# Patient Record
Sex: MALE | Race: WHITE | NOT HISPANIC OR LATINO | Employment: OTHER | URBAN - METROPOLITAN AREA
[De-identification: names, ages, dates, MRNs, and addresses within clinical notes are randomized per-mention and may not be internally consistent; named-entity substitution may affect disease eponyms.]

---

## 2022-06-27 ENCOUNTER — OFFICE VISIT (OUTPATIENT)
Dept: INTERNAL MEDICINE | Facility: CLINIC | Age: 72
End: 2022-06-27

## 2022-06-27 VITALS
HEART RATE: 84 BPM | WEIGHT: 254 LBS | SYSTOLIC BLOOD PRESSURE: 130 MMHG | HEIGHT: 72 IN | BODY MASS INDEX: 34.4 KG/M2 | DIASTOLIC BLOOD PRESSURE: 82 MMHG | OXYGEN SATURATION: 97 %

## 2022-06-27 DIAGNOSIS — H91.93 HEARING DIFFICULTY OF BOTH EARS: ICD-10-CM

## 2022-06-27 DIAGNOSIS — R29.898 LIMB WEAKNESS: ICD-10-CM

## 2022-06-27 DIAGNOSIS — F32.A DEPRESSION, UNSPECIFIED DEPRESSION TYPE: Primary | ICD-10-CM

## 2022-06-27 DIAGNOSIS — Z00.00 HEALTH CARE MAINTENANCE: ICD-10-CM

## 2022-06-27 DIAGNOSIS — Z12.5 SCREENING FOR MALIGNANT NEOPLASM OF PROSTATE: ICD-10-CM

## 2022-06-27 PROCEDURE — 99204 OFFICE O/P NEW MOD 45 MIN: CPT | Performed by: PHYSICIAN ASSISTANT

## 2022-06-27 RX ORDER — FLUOXETINE 10 MG/1
10 CAPSULE ORAL DAILY
Qty: 90 CAPSULE | Refills: 1 | Status: SHIPPED | OUTPATIENT
Start: 2022-06-27 | End: 2022-07-12

## 2022-06-27 NOTE — PROGRESS NOTES
[facility] PRIMARY CARE  [address]  [facility] [facility] [address]  Dept: [phone]  Dept Fax: [phone]  Loc: [phone]  Loc Fax: [phone]    Momo Irvin  1950    New Patient Office Note    History of Present Illness:  Patient is a 71-year-old male in today to establish care for decreased hearing in both ears, limb weakness, and depression.  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  Patient has seen a lot of traumatic events throughout his career.  Patient suffering from depression now and has lost a lot of close friends over the last year.  Patient having difficulty concentrating and tearful.  Patient open to trying a medication for this.  Also open to seeing a therapist to discuss his feelings.  Patient has noticed a change in his temper and has not as much patience as he used to.  Patient denies any SI or HI.    Patient reports limb weakness and would like referred to physical therapy for further evaluation and treatment.    Patient has noticed decreased hearing in both of his ears over the last year.  Patient would like referral to audiology for further evaluation and treatment.      The following portions of the patient's history were reviewed and updated as appropriate: allergies, current medications, past family history, past medical history, past social history, past surgical history, and problem list.    Medications:    Current Outpatient Medications:   •  FLUoxetine (PROzac) 10 MG capsule, Take 1 capsule by mouth Daily., Disp: 90 capsule, Rfl: 1    Subjective  No Known Allergies     Past Medical History:   Diagnosis Date   • HL (hearing loss)        Past Surgical History:   Procedure Laterality Date   • THYROID SURGERY         Family History   Problem Relation Age of Onset   • Hyperlipidemia Mother    • Hypertension Mother    • Cancer Mother         Social History     Socioeconomic History   • Marital status:  "   Tobacco Use   • Smoking status: Never Smoker   • Smokeless tobacco: Never Used   Vaping Use   • Vaping Use: Never used   Substance and Sexual Activity   • Alcohol use: Never   • Drug use: Never   • Sexual activity: Defer       Review of Systems   Constitutional: Negative for activity change, chills, fatigue, fever and unexpected weight change.   HENT: Positive for hearing loss. Negative for congestion, ear pain, postnasal drip, sinus pressure and sore throat.    Eyes: Negative for pain, discharge and redness.   Respiratory: Negative for cough, shortness of breath and wheezing.    Cardiovascular: Negative for chest pain, palpitations and leg swelling.   Gastrointestinal: Negative for diarrhea, nausea and vomiting.   Endocrine: Negative for cold intolerance and heat intolerance.   Genitourinary: Negative for decreased urine volume and dysuria.   Musculoskeletal: Negative for arthralgias and myalgias.   Skin: Negative for rash and wound.   Neurological: Positive for weakness. Negative for dizziness, light-headedness and headaches.   Hematological: Does not bruise/bleed easily.   Psychiatric/Behavioral: Positive for decreased concentration and dysphoric mood. Negative for confusion, self-injury, sleep disturbance and suicidal ideas. The patient is not nervous/anxious.        Objective  Vitals:    06/27/22 1139   BP: 130/82   Pulse: 84   SpO2: 97%   Weight: 115 kg (254 lb)   Height: 182.9 cm (72\")       Physical Exam  Physical Exam  Vitals and nursing note reviewed.   Constitutional:       General: He is not in acute distress.     Appearance: He is not ill-appearing.   HENT:      Head: Normocephalic.      Right Ear: Tympanic membrane, ear canal and external ear normal. There is no impacted cerumen.      Left Ear: Tympanic membrane, ear canal and external ear normal. There is no impacted cerumen.      Nose: No congestion or rhinorrhea.      Mouth/Throat:      Mouth: Mucous membranes are moist.      Pharynx: " Oropharynx is clear. No oropharyngeal exudate or posterior oropharyngeal erythema.   Eyes:      General:         Right eye: No discharge.         Left eye: No discharge.      Extraocular Movements: Extraocular movements intact.      Conjunctiva/sclera: Conjunctivae normal.      Pupils: Pupils are equal, round, and reactive to light.   Cardiovascular:      Rate and Rhythm: Normal rate and regular rhythm.      Heart sounds: Normal heart sounds. No murmur heard.    No friction rub. No gallop.   Pulmonary:      Effort: Pulmonary effort is normal. No respiratory distress.      Breath sounds: Normal breath sounds. No wheezing.   Abdominal:      General: Bowel sounds are normal. There is no distension.      Palpations: Abdomen is soft. There is no mass.      Tenderness: There is no abdominal tenderness.   Musculoskeletal:         General: No swelling. Normal range of motion.      Cervical back: Normal range of motion. No tenderness.      Right lower leg: No edema.      Left lower leg: No edema.   Lymphadenopathy:      Cervical: No cervical adenopathy.   Skin:     Findings: No bruising, erythema or rash.   Neurological:      Mental Status: He is oriented to person, place, and time.      Gait: Gait normal.   Psychiatric:         Mood and Affect: Mood normal.         Behavior: Behavior normal.         Thought Content: Thought content normal.         Judgment: Judgment normal.         Diagnostic Data  Procedures    Assessment  Diagnoses and all orders for this visit:    1. Depression, unspecified depression type (Primary)  -     Ambulatory Referral to Psychiatry    2. Limb weakness  -     Ambulatory Referral to Physical Therapy Evaluate and treat    3. Hearing difficulty of both ears  -     Ambulatory Referral to Audiology    4. Health care maintenance  -     CBC w AUTO Differential; Future  -     Comprehensive Metabolic Panel  -     TSH; Future  -     Hemoglobin A1c; Future  -     Lipid Panel  -     Hepatitis C  Antibody    Other orders  -     FLUoxetine (PROzac) 10 MG capsule; Take 1 capsule by mouth Daily.  Dispense: 90 capsule; Refill: 1        Plan    1. Depression, unspecified depression type (Primary)- worse, start Prozac 10 mg daily.  Advised for any side effects to call back immediately.  For SI or HI to call 911 immediately.Patient verbalized understanding of all instructions given and complied.  Referred to psychiatry.    2. Limb weakness- worse, referred to physical therapy.  May need neurology referral.    3. Hearing difficulty of both ears- worse, referred to audiology.    4. Health care maintenance- obtain fasting labs.      Return in about 2 weeks (around 7/11/2022) for Recheck.    David Cortés PA-C  06/27/2022

## 2022-06-28 ENCOUNTER — LAB (OUTPATIENT)
Dept: LAB | Facility: HOSPITAL | Age: 72
End: 2022-06-28

## 2022-06-28 LAB
ALBUMIN SERPL-MCNC: 4.5 G/DL (ref 3.5–5.2)
ALBUMIN/GLOB SERPL: 1.6 G/DL
ALP SERPL-CCNC: 52 U/L (ref 39–117)
ALT SERPL W P-5'-P-CCNC: 14 U/L (ref 1–41)
ANION GAP SERPL CALCULATED.3IONS-SCNC: 15.4 MMOL/L (ref 5–15)
AST SERPL-CCNC: 32 U/L (ref 1–40)
BASOPHILS # BLD AUTO: 0.05 10*3/MM3 (ref 0–0.2)
BASOPHILS NFR BLD AUTO: 1.3 % (ref 0–1.5)
BILIRUB SERPL-MCNC: 0.6 MG/DL (ref 0–1.2)
BUN SERPL-MCNC: 20 MG/DL (ref 8–23)
BUN/CREAT SERPL: 12.7 (ref 7–25)
CALCIUM SPEC-SCNC: 8.3 MG/DL (ref 8.6–10.5)
CHLORIDE SERPL-SCNC: 97 MMOL/L (ref 98–107)
CHOLEST SERPL-MCNC: 294 MG/DL (ref 0–200)
CO2 SERPL-SCNC: 25.6 MMOL/L (ref 22–29)
CREAT SERPL-MCNC: 1.57 MG/DL (ref 0.76–1.27)
DEPRECATED RDW RBC AUTO: 55.8 FL (ref 37–54)
EGFRCR SERPLBLD CKD-EPI 2021: 46.5 ML/MIN/1.73
EOSINOPHIL # BLD AUTO: 0.17 10*3/MM3 (ref 0–0.4)
EOSINOPHIL NFR BLD AUTO: 4.5 % (ref 0.3–6.2)
ERYTHROCYTE [DISTWIDTH] IN BLOOD BY AUTOMATED COUNT: 15.9 % (ref 12.3–15.4)
GLOBULIN UR ELPH-MCNC: 2.9 GM/DL
GLUCOSE SERPL-MCNC: 91 MG/DL (ref 65–99)
HBA1C MFR BLD: 5.8 % (ref 4.8–5.6)
HCT VFR BLD AUTO: 31.5 % (ref 37.5–51)
HCV AB SER DONR QL: NORMAL
HDLC SERPL-MCNC: 48 MG/DL (ref 40–60)
HGB BLD-MCNC: 10.6 G/DL (ref 13–17.7)
IMM GRANULOCYTES # BLD AUTO: 0.01 10*3/MM3 (ref 0–0.05)
IMM GRANULOCYTES NFR BLD AUTO: 0.3 % (ref 0–0.5)
LDLC SERPL CALC-MCNC: 222 MG/DL (ref 0–100)
LDLC/HDLC SERPL: 4.57 {RATIO}
LYMPHOCYTES # BLD AUTO: 1.21 10*3/MM3 (ref 0.7–3.1)
LYMPHOCYTES NFR BLD AUTO: 32.2 % (ref 19.6–45.3)
MCH RBC QN AUTO: 32.4 PG (ref 26.6–33)
MCHC RBC AUTO-ENTMCNC: 33.7 G/DL (ref 31.5–35.7)
MCV RBC AUTO: 96.3 FL (ref 79–97)
MONOCYTES # BLD AUTO: 0.25 10*3/MM3 (ref 0.1–0.9)
MONOCYTES NFR BLD AUTO: 6.6 % (ref 5–12)
NEUTROPHILS NFR BLD AUTO: 2.07 10*3/MM3 (ref 1.7–7)
NEUTROPHILS NFR BLD AUTO: 55.1 % (ref 42.7–76)
NRBC BLD AUTO-RTO: 0 /100 WBC (ref 0–0.2)
PLATELET # BLD AUTO: 210 10*3/MM3 (ref 140–450)
PMV BLD AUTO: 9.6 FL (ref 6–12)
POTASSIUM SERPL-SCNC: 3.1 MMOL/L (ref 3.5–5.2)
PROT SERPL-MCNC: 7.4 G/DL (ref 6–8.5)
RBC # BLD AUTO: 3.27 10*6/MM3 (ref 4.14–5.8)
SODIUM SERPL-SCNC: 138 MMOL/L (ref 136–145)
TRIGL SERPL-MCNC: 133 MG/DL (ref 0–150)
TSH SERPL DL<=0.05 MIU/L-ACNC: 177 UIU/ML (ref 0.27–4.2)
VLDLC SERPL-MCNC: 24 MG/DL (ref 5–40)
WBC NRBC COR # BLD: 3.76 10*3/MM3 (ref 3.4–10.8)

## 2022-06-28 PROCEDURE — 84443 ASSAY THYROID STIM HORMONE: CPT | Performed by: PHYSICIAN ASSISTANT

## 2022-06-28 PROCEDURE — 80053 COMPREHEN METABOLIC PANEL: CPT | Performed by: PHYSICIAN ASSISTANT

## 2022-06-28 PROCEDURE — G0103 PSA SCREENING: HCPCS | Performed by: PHYSICIAN ASSISTANT

## 2022-06-28 PROCEDURE — 85025 COMPLETE CBC W/AUTO DIFF WBC: CPT | Performed by: PHYSICIAN ASSISTANT

## 2022-06-28 PROCEDURE — 80061 LIPID PANEL: CPT | Performed by: PHYSICIAN ASSISTANT

## 2022-06-28 PROCEDURE — 83036 HEMOGLOBIN GLYCOSYLATED A1C: CPT | Performed by: PHYSICIAN ASSISTANT

## 2022-06-28 PROCEDURE — 86803 HEPATITIS C AB TEST: CPT | Performed by: PHYSICIAN ASSISTANT

## 2022-06-28 PROCEDURE — 36415 COLL VENOUS BLD VENIPUNCTURE: CPT | Performed by: PHYSICIAN ASSISTANT

## 2022-06-29 LAB — PSA SERPL-MCNC: 0.72 NG/ML (ref 0–4)

## 2022-06-30 DIAGNOSIS — E78.5 HYPERLIPIDEMIA, UNSPECIFIED HYPERLIPIDEMIA TYPE: Primary | ICD-10-CM

## 2022-06-30 DIAGNOSIS — E03.9 HYPOTHYROIDISM, UNSPECIFIED TYPE: ICD-10-CM

## 2022-06-30 DIAGNOSIS — D64.9 ANEMIA, UNSPECIFIED TYPE: ICD-10-CM

## 2022-06-30 RX ORDER — ATORVASTATIN CALCIUM 10 MG/1
10 TABLET, FILM COATED ORAL NIGHTLY
Qty: 30 TABLET | Refills: 5 | Status: SHIPPED | OUTPATIENT
Start: 2022-06-30

## 2022-06-30 RX ORDER — LEVOTHYROXINE SODIUM 0.03 MG/1
25 TABLET ORAL DAILY
Qty: 30 TABLET | Refills: 1 | Status: SHIPPED | OUTPATIENT
Start: 2022-06-30 | End: 2022-07-07 | Stop reason: SDUPTHER

## 2022-07-05 ENCOUNTER — TELEPHONE (OUTPATIENT)
Dept: INTERNAL MEDICINE | Facility: CLINIC | Age: 72
End: 2022-07-05

## 2022-07-05 DIAGNOSIS — E89.0 H/O THYROIDECTOMY: Primary | ICD-10-CM

## 2022-07-06 ENCOUNTER — LAB (OUTPATIENT)
Dept: LAB | Facility: HOSPITAL | Age: 72
End: 2022-07-06

## 2022-07-06 PROCEDURE — 83540 ASSAY OF IRON: CPT | Performed by: PHYSICIAN ASSISTANT

## 2022-07-06 PROCEDURE — 84466 ASSAY OF TRANSFERRIN: CPT | Performed by: PHYSICIAN ASSISTANT

## 2022-07-06 PROCEDURE — 82746 ASSAY OF FOLIC ACID SERUM: CPT | Performed by: PHYSICIAN ASSISTANT

## 2022-07-06 PROCEDURE — 82607 VITAMIN B-12: CPT | Performed by: PHYSICIAN ASSISTANT

## 2022-07-07 ENCOUNTER — OFFICE VISIT (OUTPATIENT)
Dept: ENDOCRINOLOGY | Facility: CLINIC | Age: 72
End: 2022-07-07

## 2022-07-07 VITALS
BODY MASS INDEX: 35.13 KG/M2 | HEART RATE: 72 BPM | WEIGHT: 259.4 LBS | HEIGHT: 72 IN | OXYGEN SATURATION: 96 % | SYSTOLIC BLOOD PRESSURE: 120 MMHG | DIASTOLIC BLOOD PRESSURE: 76 MMHG

## 2022-07-07 DIAGNOSIS — Z85.850 HISTORY OF THYROID CANCER: ICD-10-CM

## 2022-07-07 DIAGNOSIS — E89.0 POSTOPERATIVE HYPOTHYROIDISM: Primary | ICD-10-CM

## 2022-07-07 PROCEDURE — 99204 OFFICE O/P NEW MOD 45 MIN: CPT | Performed by: INTERNAL MEDICINE

## 2022-07-07 RX ORDER — LEVOTHYROXINE SODIUM 0.07 MG/1
75 TABLET ORAL DAILY
Qty: 30 TABLET | Refills: 5 | Status: SHIPPED | OUTPATIENT
Start: 2022-07-07 | End: 2023-01-04

## 2022-07-07 NOTE — PROGRESS NOTES
"Chief Complaint   Patient presents with   • Establish Care   • Hypothyroidism     Not on any medication patient was a former patient of Corona Cortés labs were preformed and pt was placed on 25mcg of medication. Patient would like nasal spray for allergies         Referring Provider  No ref. provider found     ANDREZ Irvin is a 71 y.o. male had concerns including Establish Care and Hypothyroidism (Not on any medication patient was a former patient of Corona Cortés labs were preformed and pt was placed on 25mcg of medication. Patient would like nasal spray for allergies ).     Referred by his PCP for hypothyroidism. He is accompanied by his wife. Recent labs checked showed TSH elevated at 177 ug/dL. He was advised to start levothyroxine 25 mcg daily last week. Half of his thyroid was surgically removed in 1988 due to an enlarged thyroid. The patient was then brought back to remove the remaining portion of his thyroid due to thyroid cancer. This was done at what was called [facility] at that time. The patient believes he was given a radioactive pill to take. His wife remembers his calcium levels had dropped and him having to have an IV to replenish it. Along the way the patient reports he quit going to the doctor and stopped taking his medicine. His wife believes he has been off the medicine for 10 to 15 years, but he reports it has been only 3 years. The wife reports he is freezing all the time. He saw the primary care provider last week who started him on 25 mcg of thyroid replacement.     The patient reports he is apprehensive about leaving the house and forces himself to do so. He describes himself as being \"out of sorts.\" PCP he saw last week, thought the patient might be a little depressed, and the patient agrees saying he has lost a lot of friends. His wife reports ever since COVID-19 he has been in the house. She also feels the patient has been straining to talk in the last 6 months. He has " been on Prozac for 1 week and feels he does not have an appetite. The patient feels very emotional and states he has never been emotional. She reports he is very tired all the time. The patient starts physical therapy next week. He reports he fell approximately 2 weeks ago and injured his hand. The patient still drives, and is agreeable to not drive until his hormone levels have improved.       Past Medical History:   Diagnosis Date   • Depression    • History of thyroid cancer    • HL (hearing loss)    • Hypothyroidism      Past Surgical History:   Procedure Laterality Date   • THYROID SURGERY        Family History   Problem Relation Age of Onset   • Hyperlipidemia Mother    • Hypertension Mother    • Cancer Mother       Social History     Socioeconomic History   • Marital status:    Tobacco Use   • Smoking status: Never Smoker   • Smokeless tobacco: Never Used   Vaping Use   • Vaping Use: Never used   Substance and Sexual Activity   • Alcohol use: Never   • Drug use: Never   • Sexual activity: Defer      No Known Allergies   Current Outpatient Medications on File Prior to Visit   Medication Sig Dispense Refill   • atorvastatin (LIPITOR) 10 MG tablet Take 1 tablet by mouth Every Night. 30 tablet 5   • FLUoxetine (PROzac) 10 MG capsule Take 1 capsule by mouth Daily. 90 capsule 1   • [DISCONTINUED] levothyroxine (SYNTHROID, LEVOTHROID) 25 MCG tablet Take 1 tablet by mouth Daily. 30 tablet 1     No current facility-administered medications on file prior to visit.        Review of Systems   Constitutional: Positive for appetite change, chills and fatigue.   HENT: Positive for hearing loss and voice change.    Respiratory: Negative for shortness of breath.    Endocrine: Positive for cold intolerance.   Musculoskeletal: Positive for gait problem. Negative for joint swelling.        Positive joint pain.   Neurological: Positive for confusion.   Psychiatric/Behavioral: Positive for agitation, behavioral problems,  "decreased concentration and depressed mood. The patient is nervous/anxious.         /76 (BP Location: Right arm, Patient Position: Sitting, Cuff Size: Adult)   Pulse 72   Ht 182.9 cm (72\")   Wt 118 kg (259 lb 6.4 oz)   SpO2 96%   BMI 35.18 kg/m²      Physical Exam  Constitutional:       Appearance: He is obese.   Musculoskeletal:      Comments: Trace lower extremity edema   Neurological:      Comments: Baseline hearing loss.         Constitutional:  well developed; well nourished  no acute distress  Body mass index is 35.18 kg/m².   ENT/Thyroid: no thyromegaly  no palpable nodules   Eyes: EOM intact  Conjunctiva: clear   Respiratory:  breathing is unlabored  clear to auscultation bilaterally   Cardiovascular:  regular rate and rhythm, S1, S2 normal, no murmur, click, rub or gallop   Chest:  Not performed.   Abdomen: Not performed.   : Not performed.   Musculoskeletal: negative findings:  ROM of all joints is normal, no deformities present   Skin: dry and warm   Neuro: normal without focal findings and mental status, speech normal, alert and oriented x3   Psych: oriented to time, place and person, mood and affect are within normal limits     Labs/Imaging    CMP  Lab Results   Component Value Date    GLUCOSE 91 06/28/2022    BUN 20 06/28/2022    CREATININE 1.57 (H) 06/28/2022    BCR 12.7 06/28/2022    K 3.1 (L) 06/28/2022    CO2 25.6 06/28/2022    CALCIUM 8.3 (L) 06/28/2022    ALBUMIN 4.50 06/28/2022    AST 32 06/28/2022    ALT 14 06/28/2022        CBC w/DIFF   Lab Results   Component Value Date    WBC 3.76 06/28/2022    RBC 3.27 (L) 06/28/2022    HGB 10.6 (L) 06/28/2022    HCT 31.5 (L) 06/28/2022    MCV 96.3 06/28/2022    MCH 32.4 06/28/2022    MCHC 33.7 06/28/2022    RDW 15.9 (H) 06/28/2022    RDWSD 55.8 (H) 06/28/2022    MPV 9.6 06/28/2022     06/28/2022    NEUTRORELPCT 55.1 06/28/2022    LYMPHORELPCT 32.2 06/28/2022    MONORELPCT 6.6 06/28/2022    EOSRELPCT 4.5 06/28/2022    BASORELPCT 1.3 " 06/28/2022    AUTOIGPER 0.3 06/28/2022    NEUTROABS 2.07 06/28/2022    LYMPHSABS 1.21 06/28/2022    MONOSABS 0.25 06/28/2022    EOSABS 0.17 06/28/2022    BASOSABS 0.05 06/28/2022    AUTOIGNUM 0.01 06/28/2022    NRBC 0.0 06/28/2022       TSH  Lab Results   Component Value Date    .000 (H) 06/28/2022       Assessment and Plan    Diagnoses and all orders for this visit:    1. Postoperative hypothyroidism (Primary)  -     levothyroxine (SYNTHROID, LEVOTHROID) 75 MCG tablet; Take 1 tablet by mouth Daily.  Dispense: 30 tablet; Refill: 5  -     T4, Free; Future  -     TSH; Future  In recent years has been untreated and now uncontrolled with  last week.   His PCP started him on levothyroxine 25 mcg last week. The patient stopped taking medication at least 3 years ago and displays clinical signs of hypothyroidism. Increase dose to 75 mcg daily and recheck labs in 6 weeks. We will titrate levothyroxine for TSH in mid normal range.     2. History of thyroid cancer  Unclear details regarding the surgery or pathology. Hypothyroidism, management as above. Once levels are better controlled, we will proceed with neck ultrasound and thyroglobulin levels. With longstanding uncontrolled hypothyroidism, he is at risk for recurrent thyroid cancer.          Return in about 4 months (around 11/7/2022) for next scheduled follow up. The patient was instructed to contact the clinic with any interval questions or concerns.    Please note that portions of this note were completed with a voice recognition program.    Transcribed from ambient dictation for Nunu Cisneros DO by Karla Ma.  07/07/22   17:13 EDT    Patient verbalized consent to the visit recording.

## 2022-07-12 DIAGNOSIS — F32.A DEPRESSION, UNSPECIFIED DEPRESSION TYPE: Primary | ICD-10-CM

## 2022-07-12 RX ORDER — VILAZODONE HYDROCHLORIDE 10 MG/1
10 TABLET ORAL DAILY
Qty: 7 TABLET | Refills: 0 | Status: SHIPPED | OUTPATIENT
Start: 2022-07-12 | End: 2022-07-19

## 2022-07-13 ENCOUNTER — TREATMENT (OUTPATIENT)
Dept: PHYSICAL THERAPY | Facility: CLINIC | Age: 72
End: 2022-07-13

## 2022-07-13 DIAGNOSIS — R29.898 LIMB WEAKNESS: Primary | ICD-10-CM

## 2022-07-13 PROCEDURE — 97161 PT EVAL LOW COMPLEX 20 MIN: CPT | Performed by: PHYSICAL THERAPIST

## 2022-07-13 NOTE — PROGRESS NOTES
Physical Therapy Initial Evaluation and Plan of Care        Patient: Momo Irvin   : 1950  Diagnosis/ICD-10 Code:  Limb weakness [R29.898]  Referring practitioner: HARPAL Darling  Date of Initial Visit: 2022  Today's Date: 2022  Patient seen for 1 sessions           Subjective Questionnaire: LEFS: 15/80      Subjective Evaluation    History of Present Illness  Mechanism of injury: Problems walking and standing up from a chair. Patient notices his walking has gotten harder gradually as he shuffles and touches the wall at his home when he can.    CLOF: wall walks in house and uses cart for walking inside store, walking is unlimited but very slow, multiple falls this year (at least 3), step to pattern and hand rail assist for stairs, B UE assist for sit to stand, UE assist to pull up from sit<>supine    Medical history: history of thyroid cancer with thyroid removed years ago, depression      Patient Occupation: retired fire department Pain  No pain reported    Social Support  Lives in: one-story house    Patient Goals  Patient goal: walk with better balance and stand up from a chair easier           Objective          Strength/Myotome Testing     Left Hip   Planes of Motion   Flexion: 4+  Extension: 3+  Abduction: 3-  External rotation: 4-    Right Hip   Planes of Motion   Flexion: 4  Extension: 4-  Abduction: 3+  External rotation: 4-    Left Knee   Flexion: 4+  Extension: 4 (significant crepitus)    Right Knee   Flexion: 4  Right knee extension strength: 5-    Left Ankle/Foot   Dorsiflexion: 4+  Left ankle plantar flexion strength: unable to perform single heel raise.  Inversion: 4+  Eversion: 4-    Right Ankle/Foot   Dorsiflexion: 4+  Right ankle plantar flexion strength: unable to perform single heel raise.  Inversion: 4+  Eversion: 4+    Lumbar Flexibility Comments:   B hip ext PROM limited to neutral    Ambulation     Observational Gait     Additional Observational Gait Details  B UE  assist using handrails to pull himself up stairs, step to pattern ascending and descending.    Comments   Seeks wall or furniture consistently, decreased B step length, decreased B foot clearance.    Functional Assessment     Single Leg Stance   Left: 0 seconds  Right: 0 seconds    Comments  5x sit to standing: 15 sec to perform 1 rep using B UEs on surface    6 min walk test: 390 ft          Assessment & Plan     Assessment  Impairments: abnormal coordination, abnormal gait, abnormal muscle firing, activity intolerance, impaired balance, impaired physical strength, lacks appropriate home exercise program and safety issue  Functional Limitations: carrying objects, lifting, walking, moving in bed and standing  Assessment details: Patient presents with impaired gait, impaired balance, and decreased functional mobility secondary to gross weakness/deconditioning and fear of falling. L LE is likely weaker to due to possible presents of primary knee OA.     Barriers to therapy: depression, age  Prognosis: good    Goals  Plan Goals: Short Term Goals 4 weeks  1. Patient to be compliant with initial HEP  2. 5x sit to  < 60 sec.  3. B hip abd strength to > 4-/5  4. Patient to improve LEFS score to at least 22/80  5. 6 min walk test to >600 ft    Long Term Goals 8 weeks  1. Patient to be independent with HEP.  2. 5x sit to  < 45 sec.  3. 6 min walk test to >900 ft  4. Single leg balance to >3 sec ea.  5. Patient to improve LEFS score to at least 30/80        Plan  Therapy options: will be seen for skilled therapy services  Planned modality interventions: cryotherapy and thermotherapy (hydrocollator packs)  Planned therapy interventions: ADL retraining, balance/weight-bearing training, functional ROM exercises, home exercise program, transfer training, therapeutic activities, strengthening, neuromuscular re-education, abdominal trunk stabilization, motor coordination training and stretching  Frequency: 2x  week  Duration in weeks: 8  Plan details: 2x/week for 8 weeks        Timed:  Manual Therapy:    0     mins  69472;  Therapeutic Exercise:    0     mins  67786;     Neuromuscular Abel:    0    mins  60770;    Therapeutic Activity:     0     mins  03979;     Gait Trainin     mins  28367;     Ultrasound:     0     mins  62809;    Electrical Stimulation:    0     mins  85043 ( );    Untimed:  Electrical Stimulation:    0     mins  35359 ( );  Mechanical Traction:    0     mins  11634;     Timed Treatment:   0   mins   Total Treatment:     50   mins    PT SIGNATURE: Tony Tolliver PT   License Number: [id number]  DATE TREATMENT INITIATED: 2022    Initial Certification  Certification Period: 10/11/2022  I certify that the therapy services are furnished while this patient is under my care.  The services outlined above are required by this patient, and will be reviewed every 90 days.     PHYSICIAN: David Cortés PA-C      DATE:     Please sign and return via fax to [phone].. Thank you, [facility].

## 2022-07-15 ENCOUNTER — OFFICE VISIT (OUTPATIENT)
Dept: INTERNAL MEDICINE | Facility: CLINIC | Age: 72
End: 2022-07-15

## 2022-07-15 VITALS
OXYGEN SATURATION: 99 % | WEIGHT: 255.6 LBS | TEMPERATURE: 96.6 F | DIASTOLIC BLOOD PRESSURE: 74 MMHG | BODY MASS INDEX: 34.62 KG/M2 | SYSTOLIC BLOOD PRESSURE: 112 MMHG | HEART RATE: 92 BPM | HEIGHT: 72 IN

## 2022-07-15 DIAGNOSIS — E03.9 HYPOTHYROIDISM, UNSPECIFIED TYPE: ICD-10-CM

## 2022-07-15 DIAGNOSIS — H91.93 HEARING DIFFICULTY OF BOTH EARS: ICD-10-CM

## 2022-07-15 DIAGNOSIS — E89.0 H/O THYROIDECTOMY: Primary | ICD-10-CM

## 2022-07-15 DIAGNOSIS — F32.A DEPRESSION, UNSPECIFIED DEPRESSION TYPE: ICD-10-CM

## 2022-07-15 PROCEDURE — 99214 OFFICE O/P EST MOD 30 MIN: CPT | Performed by: PHYSICIAN ASSISTANT

## 2022-07-15 NOTE — PROGRESS NOTES
[facility]  [address]  [facility] [address]  Dept: [phone]  Dept Fax: [phone]  Loc: [phone]  Loc Fax: [phone]    Momo Irvin  1950    Follow Up Office Visit Note    History of Present Illness:  Patient is a 71-year-old male in today to follow-up for hypothyroidism status post thyroidectomy, depression, and hearing difficulty.  Patient has seen endocrinology who has increase Synthroid to 75 mcg daily.  Patient taking as directed without problems or side effects.  Due to see endocrinology again in 8 weeks.  Reports improvement of symptoms.    Patient depression about the same, off Prozac now due to nausea.  Has not started Viibryd yet.  Due to pick this up today.    Patient seen audiology for hearing difficulty.  Getting hearing aids.    DepressionPatient is not experiencing: confusion, nervousness/anxiety, palpitations, shortness of breath and suicidal ideas.        The following portions of the patient's history were reviewed and updated as appropriate: allergies, current medications, past family history, past medical history, past social history, past surgical history, and problem list.    Medications:    Current Outpatient Medications:   •  atorvastatin (LIPITOR) 10 MG tablet, Take 1 tablet by mouth Every Night., Disp: 30 tablet, Rfl: 5  •  levothyroxine (SYNTHROID, LEVOTHROID) 75 MCG tablet, Take 1 tablet by mouth Daily., Disp: 30 tablet, Rfl: 5  •  vilazodone (VIIBRYD) 10 MG tablet tablet, Take 1 tablet by mouth Daily for 7 days., Disp: 7 tablet, Rfl: 0    Subjective  No Known Allergies     Past Medical History:   Diagnosis Date   • Depression    • History of thyroid cancer    • HL (hearing loss)    • Hypothyroidism        Past Surgical History:   Procedure Laterality Date   • THYROID SURGERY         Family History   Problem Relation Age of Onset   • Hyperlipidemia Mother    • Hypertension Mother    • Cancer Mother      "    Social History     Socioeconomic History   • Marital status:    Tobacco Use   • Smoking status: Never Smoker   • Smokeless tobacco: Never Used   Vaping Use   • Vaping Use: Never used   Substance and Sexual Activity   • Alcohol use: Never   • Drug use: Never   • Sexual activity: Defer       Review of Systems   Constitutional: Negative for activity change, chills, fatigue, fever and unexpected weight change.   HENT: Positive for hearing loss. Negative for congestion, ear pain, postnasal drip, sinus pressure and sore throat.    Eyes: Negative for pain, discharge and redness.   Respiratory: Negative for cough, shortness of breath and wheezing.    Cardiovascular: Negative for chest pain, palpitations and leg swelling.   Gastrointestinal: Negative for diarrhea, nausea and vomiting.   Endocrine: Negative for cold intolerance and heat intolerance.   Genitourinary: Negative for decreased urine volume and dysuria.   Musculoskeletal: Negative for arthralgias and myalgias.   Skin: Negative for rash and wound.   Neurological: Negative for dizziness, light-headedness and headaches.   Hematological: Does not bruise/bleed easily.   Psychiatric/Behavioral: Positive for dysphoric mood. Negative for confusion, self-injury, sleep disturbance and suicidal ideas. The patient is not nervous/anxious.          Objective  Vitals:    07/15/22 0906 07/15/22 0938   BP: 112/92 112/74   BP Location: Left arm    Patient Position: Sitting    Pulse: 92    Temp: 96.6 °F (35.9 °C)    TempSrc: Temporal    SpO2: 99%    Weight: 116 kg (255 lb 9.6 oz)    Height: 182.9 cm (72.01\")      Body mass index is 34.66 kg/m².     Physical Exam  Physical Exam  Vitals and nursing note reviewed.   Constitutional:       General: He is not in acute distress.     Appearance: He is not ill-appearing.   HENT:      Head: Normocephalic.      Right Ear: Tympanic membrane, ear canal and external ear normal. There is no impacted cerumen.      Left Ear: Tympanic " membrane, ear canal and external ear normal. There is no impacted cerumen.      Nose: No congestion or rhinorrhea.      Mouth/Throat:      Mouth: Mucous membranes are moist.      Pharynx: Oropharynx is clear. No oropharyngeal exudate or posterior oropharyngeal erythema.   Eyes:      General:         Right eye: No discharge.         Left eye: No discharge.      Extraocular Movements: Extraocular movements intact.      Conjunctiva/sclera: Conjunctivae normal.      Pupils: Pupils are equal, round, and reactive to light.   Cardiovascular:      Rate and Rhythm: Normal rate and regular rhythm.      Heart sounds: Normal heart sounds. No murmur heard.    No friction rub. No gallop.   Pulmonary:      Effort: Pulmonary effort is normal. No respiratory distress.      Breath sounds: Normal breath sounds. No wheezing.   Abdominal:      General: Bowel sounds are normal. There is no distension.      Palpations: Abdomen is soft. There is no mass.      Tenderness: There is no abdominal tenderness.   Musculoskeletal:         General: No swelling. Normal range of motion.      Cervical back: Normal range of motion. No tenderness.      Right lower leg: No edema.      Left lower leg: No edema.   Lymphadenopathy:      Cervical: No cervical adenopathy.   Skin:     Findings: No bruising, erythema or rash.   Neurological:      Mental Status: He is oriented to person, place, and time.      Gait: Gait normal.   Psychiatric:         Mood and Affect: Mood normal.         Behavior: Behavior normal.         Thought Content: Thought content normal.         Judgment: Judgment normal.         Diagnostic Data  Procedures    Assessment  Diagnoses and all orders for this visit:    1. H/O thyroidectomy (Primary)    2. Depression, unspecified depression type    3. Hearing difficulty of both ears    4. Hypothyroidism, unspecified type        Plan    1. H/O thyroidectomy (Primary)- on Synthroid 75 mcg daily.  Doing better.  Continue to follow-up with  endocrinology.    2. Depression, unspecified depression type- unchanged, start Viibryd today.  For side effects call back.    3. Hearing difficulty of both ears- worse, getting hearing aids soon.    4. Hypothyroidism, unspecified type- on Synthroid 75 mcg daily.  Doing better.  Continue to follow-up with endocrinology.        Return in about 2 months (around 9/15/2022) for Recheck.    David Cortés PA-C  07/15/2022

## 2022-07-20 ENCOUNTER — TREATMENT (OUTPATIENT)
Dept: PHYSICAL THERAPY | Facility: CLINIC | Age: 72
End: 2022-07-20

## 2022-07-20 DIAGNOSIS — R11.0 NAUSEA: Primary | ICD-10-CM

## 2022-07-20 DIAGNOSIS — R29.898 LIMB WEAKNESS: Primary | ICD-10-CM

## 2022-07-20 PROCEDURE — 97110 THERAPEUTIC EXERCISES: CPT | Performed by: PHYSICAL THERAPIST

## 2022-07-20 PROCEDURE — 97112 NEUROMUSCULAR REEDUCATION: CPT | Performed by: PHYSICAL THERAPIST

## 2022-07-20 RX ORDER — ONDANSETRON 8 MG/1
TABLET, ORALLY DISINTEGRATING ORAL
Qty: 20 TABLET | Refills: 1 | Status: SHIPPED | OUTPATIENT
Start: 2022-07-20 | End: 2023-03-31

## 2022-07-20 NOTE — PROGRESS NOTES
Physical Therapy Daily Progress Note        Patient: Momo Irvin   : 1950  Diagnosis/ICD-10 Code:  Limb weakness [R29.898]  Referring practitioner: No ref. provider found  Date of Initial Visit: Type: THERAPY  Noted: 2022  Today's Date: 2022  Patient seen for 2 sessions           Patient reports: being placed back on thyroid medication this week.      Objective   See Exercise, Manual, and Modality Logs for complete treatment.       Assessment/Plan  Severe apprehension leading to unwillingness to remove UE support during //bar activities present. In spite of constant encouragement, safe set up of his immediate environment, and direct CGA, he eventually refused to remove UE assist during step ups even though PT deemed he would be safe to trial it. Added strengthening in supine for HEP.            Timed:  Manual Therapy:    0     mins  07193;  Therapeutic Exercise:    30     mins  05180;     Neuromuscular Abel:   20    mins  54406;    Therapeutic Activity:     0     mins  12303;     Gait Trainin     mins  00985;     Ultrasound:     0     mins  30150;    Electrical Stimulation:    0     mins  50035 ( );    Untimed:  Electrical Stimulation:    0     mins  75073 ( );  Mechanical Traction:    0     mins  70740;     Timed Treatment:   50   mins   Total Treatment:     50   mins    Tony Tolliver PT  Physical Therapist

## 2022-07-22 ENCOUNTER — TREATMENT (OUTPATIENT)
Dept: PHYSICAL THERAPY | Facility: CLINIC | Age: 72
End: 2022-07-22

## 2022-07-22 DIAGNOSIS — R29.898 LIMB WEAKNESS: Primary | ICD-10-CM

## 2022-07-22 PROCEDURE — 97112 NEUROMUSCULAR REEDUCATION: CPT | Performed by: PHYSICAL THERAPIST

## 2022-07-22 PROCEDURE — 97110 THERAPEUTIC EXERCISES: CPT | Performed by: PHYSICAL THERAPIST

## 2022-07-22 PROCEDURE — 97530 THERAPEUTIC ACTIVITIES: CPT | Performed by: PHYSICAL THERAPIST

## 2022-07-22 NOTE — PROGRESS NOTES
Physical Therapy Daily Progress Note        Patient: Momo Irvin   : 1950  Diagnosis/ICD-10 Code:  Limb weakness [R29.898]  Referring practitioner: HARPAL Darling  Date of Initial Visit: Type: THERAPY  Noted: 2022  Today's Date: 2022  Patient seen for 3 sessions           Patient reports: getting properly supportive shoes and not feeling tired after last visit, though his low back is sore today.      Objective   See Exercise, Manual, and Modality Logs for complete treatment.       Assessment/Plan    Many exercises held or modified today due to presence of L low back pain. It was not improved or worsened after repeated lumbar flexion or LTRs. Patient was able to perform gait activities more effectively when removed from //bars using CGA.          Timed:  Manual Therapy:    0     mins  83800;  Therapeutic Exercise:    15     mins  69567;     Neuromuscular Abel:   15    mins  64937;    Therapeutic Activity:     10     mins  69774;     Gait Trainin     mins  80491;     Ultrasound:     0     mins  35992;    Electrical Stimulation:    0     mins  31732 ( );    Untimed:  Electrical Stimulation:    0     mins  68936 ( );  Mechanical Traction:    0     mins  29277;     Timed Treatment:   40   mins   Total Treatment:     40   mins    Tony Tolliver PT  Physical Therapist

## 2022-07-25 ENCOUNTER — TREATMENT (OUTPATIENT)
Dept: PHYSICAL THERAPY | Facility: CLINIC | Age: 72
End: 2022-07-25

## 2022-07-25 DIAGNOSIS — R29.898 LIMB WEAKNESS: Primary | ICD-10-CM

## 2022-07-25 PROCEDURE — 97110 THERAPEUTIC EXERCISES: CPT | Performed by: PHYSICAL THERAPIST

## 2022-07-25 PROCEDURE — 97112 NEUROMUSCULAR REEDUCATION: CPT | Performed by: PHYSICAL THERAPIST

## 2022-07-25 NOTE — PROGRESS NOTES
Physical Therapy Daily Progress Note        Patient: Momo Irvin   : 1950  Diagnosis/ICD-10 Code:  Limb weakness [R29.898]  Referring practitioner: HARPAL Darling  Date of Initial Visit: Type: THERAPY  Noted: 2022  Today's Date: 2022  Patient seen for 4 sessions           Patient reports: his low back has been feeling good the past few days with performing log rolls for transfers.      Objective   See Exercise, Manual, and Modality Logs for complete treatment.       Assessment/Plan  Improved willingness to perform //bar activities with less UE support today. He was not limited by low back pain and he was able to resume table strengthening exercises. He required frequent rest breaks but tolerated treatment well.             Timed:  Manual Therapy:    0     mins  51237;  Therapeutic Exercise:    35     mins  21151;     Neuromuscular Abel:   15    mins  53104;    Therapeutic Activity:     0     mins  48534;     Gait Trainin     mins  00886;     Ultrasound:     0     mins  60137;    Electrical Stimulation:    0     mins  91379 ( );    Untimed:  Electrical Stimulation:    0     mins  59432 ( );  Mechanical Traction:    0     mins  86492;     Timed Treatment:   50   mins   Total Treatment:     50   mins    Tony Tolliver PT  Physical Therapist

## 2022-07-27 ENCOUNTER — TREATMENT (OUTPATIENT)
Dept: PHYSICAL THERAPY | Facility: CLINIC | Age: 72
End: 2022-07-27

## 2022-07-27 DIAGNOSIS — R29.898 LIMB WEAKNESS: Primary | ICD-10-CM

## 2022-07-27 PROCEDURE — 97110 THERAPEUTIC EXERCISES: CPT | Performed by: PHYSICAL THERAPIST

## 2022-07-27 PROCEDURE — 97112 NEUROMUSCULAR REEDUCATION: CPT | Performed by: PHYSICAL THERAPIST

## 2022-07-27 NOTE — PROGRESS NOTES
Physical Therapy Daily Progress Note        Patient: Momo Irvin   : 1950  Diagnosis/ICD-10 Code:  Limb weakness [R29.898]  Referring practitioner: HARPAL Darling  Date of Initial Visit: Type: THERAPY  Noted: 2022  Today's Date: 2022  Patient seen for 5 sessions           Patient reports: experiencing residual anxiety and increased fear of falling after last visit, which he specifically attributed to practicing walking backwards.      Objective   See Exercise, Manual, and Modality Logs for complete treatment.       Assessment/Plan    Held bwd walking due to anxiety increase following previous visit. Added multidirectional sidestepping to progress gait activities.          Timed:  Manual Therapy:    0     mins  05859;  Therapeutic Exercise:    20     mins  51779;     Neuromuscular Abel:   20    mins  14797;    Therapeutic Activity:     0     mins  38421;     Gait Trainin     mins  08552;     Ultrasound:     0     mins  57677;    Electrical Stimulation:    0     mins  60525 ( );    Untimed:  Electrical Stimulation:    0     mins  60633 ( );  Mechanical Traction:    0     mins  79446;     Timed Treatment:   40   mins   Total Treatment:     40   mins    Tony Tolliver PT  Physical Therapist

## 2022-08-03 ENCOUNTER — TREATMENT (OUTPATIENT)
Dept: PHYSICAL THERAPY | Facility: CLINIC | Age: 72
End: 2022-08-03

## 2022-08-03 DIAGNOSIS — R29.898 LIMB WEAKNESS: Primary | ICD-10-CM

## 2022-08-03 PROCEDURE — 97110 THERAPEUTIC EXERCISES: CPT | Performed by: PHYSICAL THERAPIST

## 2022-08-03 PROCEDURE — 97530 THERAPEUTIC ACTIVITIES: CPT | Performed by: PHYSICAL THERAPIST

## 2022-08-03 PROCEDURE — 97112 NEUROMUSCULAR REEDUCATION: CPT | Performed by: PHYSICAL THERAPIST

## 2022-08-03 NOTE — PROGRESS NOTES
Physical Therapy Daily Progress Note        Patient: Momo Irvin   : 1950  Diagnosis/ICD-10 Code:  Limb weakness [R29.898]  Referring practitioner: HEMAL Darling*  Date of Initial Visit: Type: THERAPY  Noted: 2022  Today's Date: 8/3/2022  Patient seen for 6 sessions           Patient reports: his back is feeling better but he can still feel it.      Objective   See Exercise, Manual, and Modality Logs for complete treatment.       Assessment/Plan  Patient demonstrated improved stability and less apprehension with gait activities outside of //bars, but he continued to be unable to remove UE support during step ups due to fear of falling. Added UE support squats and progressed reps of table strengthening exercises as well. Frequent standing/sitting breaks provided, which correlated with BP around 120 bpm.            Timed:  Manual Therapy:    0     mins  34183;  Therapeutic Exercise:    25     mins  40313;     Neuromuscular Abel:   19    mins  31673;    Therapeutic Activity:     10     mins  92184;     Gait Trainin     mins  29200;     Ultrasound:     0     mins  66683;    Electrical Stimulation:    0     mins  75459 ( );    Untimed:  Electrical Stimulation:    0     mins  80667 ( );  Mechanical Traction:    0     mins  92249;     Timed Treatment:   54   mins   Total Treatment:     54   mins    Tony Tolliver PT  Physical Therapist

## 2022-08-08 ENCOUNTER — TREATMENT (OUTPATIENT)
Dept: PHYSICAL THERAPY | Facility: CLINIC | Age: 72
End: 2022-08-08

## 2022-08-08 DIAGNOSIS — R29.898 LIMB WEAKNESS: Primary | ICD-10-CM

## 2022-08-08 PROCEDURE — 97530 THERAPEUTIC ACTIVITIES: CPT | Performed by: PHYSICAL THERAPIST

## 2022-08-08 PROCEDURE — 97110 THERAPEUTIC EXERCISES: CPT | Performed by: PHYSICAL THERAPIST

## 2022-08-08 NOTE — PROGRESS NOTES
Physical Therapy Daily Progress Note        Patient: Momo Irvin   : 1950  Diagnosis/ICD-10 Code:  Limb weakness [R29.898]  Referring practitioner: HARPAL Darling  Date of Initial Visit: Type: THERAPY  Noted: 2022  Today's Date: 2022  Patient seen for 7 sessions           Patient reports: his back is feeling better.      Objective   See Exercise, Manual, and Modality Logs for complete treatment.       Assessment/Plan  Progressed strengthening on table and with lateral component with step ups. He continues to demonstrate apprehension with gait activities and seek external support when available.            Timed:  Manual Therapy:    0     mins  36990;  Therapeutic Exercise:    35     mins  05238;     Neuromuscular Abel:   0    mins  97705;    Therapeutic Activity:     15     mins  78309;     Gait Trainin     mins  32588;     Ultrasound:     0     mins  90022;    Electrical Stimulation:    0     mins  14400 ( );    Untimed:  Electrical Stimulation:    0     mins  79322 ( );  Mechanical Traction:    0     mins  06454;     Timed Treatment:   50   mins   Total Treatment:     50   mins    Tony Tolliver PT  Physical Therapist

## 2022-08-09 ENCOUNTER — TELEPHONE (OUTPATIENT)
Dept: INTERNAL MEDICINE | Facility: CLINIC | Age: 72
End: 2022-08-09

## 2022-08-09 NOTE — TELEPHONE ENCOUNTER
Caller: Momo Irvin    Relationship to patient: Self    Best call back number: [phone]    What is the call regarding: PATIENT STATES THAT HE IS HAVING SOME HEALTH ISSUES THAT HE WOULD LIKE TO TALK WITH UYEN REGARDING.

## 2022-08-10 ENCOUNTER — TREATMENT (OUTPATIENT)
Dept: PHYSICAL THERAPY | Facility: CLINIC | Age: 72
End: 2022-08-10

## 2022-08-10 ENCOUNTER — OFFICE VISIT (OUTPATIENT)
Dept: INTERNAL MEDICINE | Facility: CLINIC | Age: 72
End: 2022-08-10

## 2022-08-10 VITALS
BODY MASS INDEX: 33.43 KG/M2 | WEIGHT: 246.8 LBS | SYSTOLIC BLOOD PRESSURE: 108 MMHG | HEART RATE: 85 BPM | OXYGEN SATURATION: 99 % | DIASTOLIC BLOOD PRESSURE: 70 MMHG | HEIGHT: 72 IN | RESPIRATION RATE: 18 BRPM | TEMPERATURE: 96.9 F

## 2022-08-10 DIAGNOSIS — G89.29 CHRONIC PAIN OF LEFT WRIST: Primary | ICD-10-CM

## 2022-08-10 DIAGNOSIS — M79.642 CHRONIC PAIN OF LEFT HAND: ICD-10-CM

## 2022-08-10 DIAGNOSIS — M25.532 CHRONIC PAIN OF LEFT WRIST: Primary | ICD-10-CM

## 2022-08-10 DIAGNOSIS — R26.89 DECREASED MOBILITY: Primary | ICD-10-CM

## 2022-08-10 DIAGNOSIS — R29.898 LIMB WEAKNESS: Primary | ICD-10-CM

## 2022-08-10 DIAGNOSIS — G89.29 CHRONIC PAIN OF LEFT HAND: ICD-10-CM

## 2022-08-10 PROCEDURE — 97110 THERAPEUTIC EXERCISES: CPT | Performed by: PHYSICAL THERAPIST

## 2022-08-10 PROCEDURE — 97530 THERAPEUTIC ACTIVITIES: CPT | Performed by: PHYSICAL THERAPIST

## 2022-08-10 PROCEDURE — 99213 OFFICE O/P EST LOW 20 MIN: CPT | Performed by: PHYSICIAN ASSISTANT

## 2022-08-10 NOTE — PROGRESS NOTES
Physical Therapy Daily Progress Note        Patient: Momo Irvin   : 1950  Diagnosis/ICD-10 Code:  Limb weakness [R29.898]  Referring practitioner: HARPAL Darling  Date of Initial Visit: Type: THERAPY  Noted: 2022  Today's Date: 8/10/2022  Patient seen for 8 sessions           Patient reports: feeling okay this morning.    Subjective Questionnaire: LEFS: 37/80    Objective          Ambulation     Ambulation: Stairs     Additional Stairs Ambulation Details  B UE assist using handrails to pull himself up stairs, step to pattern ascending and descending.    Functional Assessment     Single Leg Stance   Left: 0 seconds  Right: 0 seconds    Comments  5x sit to standin sec using B UEs for armrest     6 min walk test: 825 ft       See Exercise, Manual, and Modality Logs for complete treatment.       Assessment/Plan  Updated functional testing in preparation for reassessment NV. He demonstrates significant improvements in LEFS, 5x sit to stand, and 6 min walk test. Treatment ended early due to patient needing to make another appointment.            Timed:  Manual Therapy:    0     mins  12041;  Therapeutic Exercise:    10     mins  76257;     Neuromuscular Abel:   0    mins  03886;    Therapeutic Activity:     25     mins  06369;     Gait Trainin     mins  45516;     Ultrasound:     0     mins  73329;    Electrical Stimulation:    0     mins  11033 ( );    Untimed:  Electrical Stimulation:    0     mins  04153 ( );  Mechanical Traction:    0     mins  77761;     Timed Treatment:   35   mins   Total Treatment:     35   mins    Tony Tolliver PT  Physical Therapist

## 2022-08-10 NOTE — PROGRESS NOTES
[facility]  [address]  [facility] [address]  Dept: [phone]  Dept Fax: [phone]  Loc: [phone]  Loc Fax: [phone]    Momo Irvin  1950    Follow Up Office Visit Note    History of Present Illness:  Patient is a 72-year-old male in today for wrist pain.  Patient fell about a month ago on an outstretched hand and went to urgent care and was ruled out for any fracture but still having quite a bit of left hand swelling as well as left hand pain and wrist pain.      The following portions of the patient's history were reviewed and updated as appropriate: allergies, current medications, past family history, past medical history, past social history, past surgical history, and problem list.    Medications:    Current Outpatient Medications:   •  atorvastatin (LIPITOR) 10 MG tablet, Take 1 tablet by mouth Every Night., Disp: 30 tablet, Rfl: 5  •  levothyroxine (SYNTHROID, LEVOTHROID) 75 MCG tablet, Take 1 tablet by mouth Daily., Disp: 30 tablet, Rfl: 5  •  ondansetron ODT (ZOFRAN-ODT) 8 MG disintegrating tablet, Take 1/2 to 1 tablet by mouth (dissolve under tongue or swallow) every 8 hours as needed for nausea. (Patient taking differently: As Needed. Take 1/2 to 1 tablet by mouth (dissolve under tongue or swallow) every 8 hours as needed for nausea.), Disp: 20 tablet, Rfl: 1  •  vilazodone (VIIBRYD) 10 MG tablet tablet, Take 1 tablet by mouth Daily for 7 days., Disp: 7 tablet, Rfl: 0    Subjective  No Known Allergies     Past Medical History:   Diagnosis Date   • Depression    • History of thyroid cancer    • HL (hearing loss)    • Hypothyroidism        Past Surgical History:   Procedure Laterality Date   • THYROID SURGERY         Family History   Problem Relation Age of Onset   • Hyperlipidemia Mother    • Hypertension Mother    • Cancer Mother         Social History     Socioeconomic History   • Marital status:    Tobacco Use   •  "Smoking status: Never Smoker   • Smokeless tobacco: Never Used   Vaping Use   • Vaping Use: Never used   Substance and Sexual Activity   • Alcohol use: Never   • Drug use: Never   • Sexual activity: Defer       Review of Systems   Constitutional: Negative for activity change, chills, fatigue, fever and unexpected weight change.   HENT: Negative for congestion, ear pain, postnasal drip, sinus pressure and sore throat.    Eyes: Negative for pain, discharge and redness.   Respiratory: Negative for cough, shortness of breath and wheezing.    Cardiovascular: Negative for chest pain, palpitations and leg swelling.   Gastrointestinal: Negative for diarrhea, nausea and vomiting.   Endocrine: Negative for cold intolerance and heat intolerance.   Genitourinary: Negative for decreased urine volume and dysuria.   Musculoskeletal: Positive for arthralgias. Negative for myalgias.   Skin: Negative for rash and wound.   Neurological: Negative for dizziness, light-headedness and headaches.   Hematological: Does not bruise/bleed easily.   Psychiatric/Behavioral: Negative for confusion, dysphoric mood and sleep disturbance. The patient is not nervous/anxious.          Objective  Vitals:    08/10/22 1035   BP: 108/70   BP Location: Left arm   Patient Position: Sitting   Cuff Size: Adult   Pulse: 85   Resp: 18   Temp: 96.9 °F (36.1 °C)   TempSrc: Temporal   SpO2: 99%   Weight: 112 kg (246 lb 12.8 oz)   Height: 182.9 cm (72.01\")     Body mass index is 33.46 kg/m².     Physical Exam  Physical Exam  Vitals and nursing note reviewed.   Constitutional:       General: He is not in acute distress.     Appearance: He is not ill-appearing.   HENT:      Head: Normocephalic.      Right Ear: Tympanic membrane, ear canal and external ear normal. There is no impacted cerumen.      Left Ear: Tympanic membrane, ear canal and external ear normal. There is no impacted cerumen.      Nose: No congestion or rhinorrhea.      Mouth/Throat:      Mouth: Mucous " membranes are moist.      Pharynx: Oropharynx is clear. No oropharyngeal exudate or posterior oropharyngeal erythema.   Eyes:      General:         Right eye: No discharge.         Left eye: No discharge.      Extraocular Movements: Extraocular movements intact.      Conjunctiva/sclera: Conjunctivae normal.      Pupils: Pupils are equal, round, and reactive to light.   Cardiovascular:      Rate and Rhythm: Normal rate and regular rhythm.      Heart sounds: Normal heart sounds. No murmur heard.    No friction rub. No gallop.   Pulmonary:      Effort: Pulmonary effort is normal. No respiratory distress.      Breath sounds: Normal breath sounds. No wheezing.   Abdominal:      General: Bowel sounds are normal. There is no distension.      Palpations: Abdomen is soft. There is no mass.      Tenderness: There is no abdominal tenderness.   Musculoskeletal:         General: Swelling (Left hand and wrist) present. Normal range of motion.      Cervical back: Normal range of motion. No tenderness.      Right lower leg: No edema.      Left lower leg: No edema.   Lymphadenopathy:      Cervical: No cervical adenopathy.   Skin:     Findings: No bruising, erythema or rash.   Neurological:      Mental Status: He is oriented to person, place, and time.      Gait: Gait normal.   Psychiatric:         Mood and Affect: Mood normal.         Behavior: Behavior normal.         Thought Content: Thought content normal.         Judgment: Judgment normal.         Diagnostic Data  Procedures    Assessment  Diagnoses and all orders for this visit:    1. Chronic pain of left wrist (Primary)  -     XR Wrist 3+ View Left; Future    2. Chronic pain of left hand  -     XR Hand 3+ View Left; Future        Plan    1. Chronic pain of left wrist (Primary)- worse, obtain x-ray left wrist.    2. Chronic pain of left hand- worse, Obtain x-ray left hand.      Return for Next scheduled follow up.    David Cortés PA-C  08/10/2022

## 2022-08-15 ENCOUNTER — TREATMENT (OUTPATIENT)
Dept: PHYSICAL THERAPY | Facility: CLINIC | Age: 72
End: 2022-08-15

## 2022-08-15 DIAGNOSIS — R29.898 LIMB WEAKNESS: Primary | ICD-10-CM

## 2022-08-15 PROCEDURE — 97112 NEUROMUSCULAR REEDUCATION: CPT | Performed by: PHYSICAL THERAPIST

## 2022-08-15 PROCEDURE — 97110 THERAPEUTIC EXERCISES: CPT | Performed by: PHYSICAL THERAPIST

## 2022-08-15 PROCEDURE — 97530 THERAPEUTIC ACTIVITIES: CPT | Performed by: PHYSICAL THERAPIST

## 2022-08-15 NOTE — PROGRESS NOTES
Re-Assessment / Re-Certification        Patient: Momo Irvin   : 1950  Diagnosis/ICD-10 Code:  Limb weakness [R29.898]  Referring practitioner: HARPAL Darling  Date of Initial Visit: Type: THERAPY  Noted: 2022  Today's Date: 8/15/2022  Patient seen for 9 sessions      Subjective:     Subjective Questionnaire: LEFS: 37/80  Clinical Progress: improved  Home Program Compliance: No  Treatment has included: therapeutic exercise, neuromuscular re-education and therapeutic activity    Subjective Evaluation    History of Present Illness  Mechanism of injury: Problems walking and standing up from a chair. Patient notices his walking has gotten harder gradually as he shuffles and touches the wall at his home when he can.    CLOF: wall walks in house, uses cart for walking inside stores unless with someone else, walking is unlimited but very slow, no falls in the last month, step to pattern and hand rail assist for stairs, B UE assist for sit to stand, UE assist to pull up from sit<>supine    Medical history: history of thyroid cancer with thyroid removed years ago, depression      Patient Occupation: retired fire department Pain  Current pain ratin  At best pain ratin  At worst pain ratin  Location: L knee, low back  Quality: stiff.         Objective          Strength/Myotome Testing     Left Hip   Planes of Motion   Flexion: 4  Extension: 4-  Abduction: 3  External rotation: 4-    Right Hip   Planes of Motion   Flexion: 4+  Extension: 4-  Abduction: 3+  External rotation: 4    Left Knee   Flexion: 4+  Extension: 4 (significant crepitus)    Right Knee   Flexion: 4  Right knee extension strength: 5-    Left Ankle/Foot   Dorsiflexion: 4+  Left ankle plantar flexion strength: unable to perform single heel raise.  Inversion: 4+  Eversion: 4+    Right Ankle/Foot   Dorsiflexion: 4+  Right ankle plantar flexion strength: unable to perform single heel raise.  Inversion: 4+  Eversion: 4+    Lumbar  Flexibility Comments:   B hip ext PROM limited to just beyond neutral    Ambulation     Ambulation: Stairs     Additional Stairs Ambulation Details  B UE assist using handrails to pull himself up stairs, step to pattern ascending and descending.    Functional Assessment     Single Leg Stance   Left: 0 seconds  Right: 0 seconds    Comments  5x sit to standin sec using B UEs for armrest, 15 sec to complete 2 reps using B UEs from sitting surface     6 min walk test: 825 ft       Assessment & Plan     Assessment  Impairments: abnormal coordination, abnormal gait, abnormal muscle firing, abnormal or restricted ROM, activity intolerance, impaired balance, impaired physical strength, lacks appropriate home exercise program and safety issue  Functional Limitations: carrying objects, lifting, walking, moving in bed and standing  Assessment details: Patient presents with impaired gait, impaired balance, and decreased functional mobility secondary to gross weakness/deconditioning and fear of falling. L LE is likely weaker to due to possible presents of primary knee OA.     8/15- Patient demonstrating improved functional mobility with objective clinical testing, but has been limited by L knee pain and intermittent anxiety related to practicing balance, which requires being placed in unstable situations. Mild strength improvements noted as well.     Goals  Plan Goals: Short Term Goals 4 weeks  1. Patient to be compliant with initial HEP. 50% met  2. 5x sit to  < 60 sec. Met   3. B hip abd strength to > 4-/5. 25% met  4. Patient to improve LEFS score to at least 22/80. Met   5. 6 min walk test to >600 ft. Met     Long Term Goals 8 weeks  1. Patient to be independent with HEP. Not  met  2. 5x sit to  < 45 sec.  3. 6 min walk test to >900 ft. 50% met   4. Single leg balance to >3 sec ea. Not met  5. Patient to improve LEFS score to at least 30/80. Met         Plan  Therapy options: will be seen for skilled  therapy services  Planned modality interventions: cryotherapy, thermotherapy (hydrocollator packs), dry needling and TENS  Planned therapy interventions: ADL retraining, balance/weight-bearing training, functional ROM exercises, home exercise program, transfer training, therapeutic activities, strengthening, neuromuscular re-education, abdominal trunk stabilization, motor coordination training, stretching, manual therapy and joint mobilization  Frequency: 2x week  Duration in weeks: 4  Treatment plan discussed with: patient  Plan details: 2x/week for 4 weeks      Progress toward previous goals: Partially Met        Timeframe: 1 month  Prognosis to achieve goals: good    PT Signature: Tony Tolliver, PT  PT License [id number]    Based upon review of the patient's progress and continued therapy plan, it is my medical opinion that Momo Irvin should continue physical therapy treatment at [facility]  [address] [place] [id number] [phone].    Signature: __________________________________  David Cortés PA-C    Timed:  Manual Therapy:    0     mins  89512;  Therapeutic Exercise:    20     mins  61051;     Neuromuscular Abel:    25    mins  02268;    Therapeutic Activity:     10     mins  01138;     Gait Trainin     mins  75978;     Ultrasound:     0     mins  23227;    Electrical Stimulation:    0     mins  97359 ( );    Untimed:  Electrical Stimulation:    0     mins  23130 ( );  Mechanical Traction:    0     mins  14228;     Timed Treatment:   55   mins   Total Treatment:     55   mins

## 2022-08-17 ENCOUNTER — TREATMENT (OUTPATIENT)
Dept: PHYSICAL THERAPY | Facility: CLINIC | Age: 72
End: 2022-08-17

## 2022-08-17 DIAGNOSIS — R29.898 LIMB WEAKNESS: Primary | ICD-10-CM

## 2022-08-17 PROCEDURE — 97112 NEUROMUSCULAR REEDUCATION: CPT | Performed by: PHYSICAL THERAPIST

## 2022-08-17 PROCEDURE — 97110 THERAPEUTIC EXERCISES: CPT | Performed by: PHYSICAL THERAPIST

## 2022-08-17 NOTE — PROGRESS NOTES
Physical Therapy Daily Progress Note        Patient: Momo Irvin   : 1950  Diagnosis/ICD-10 Code:  Limb weakness [R29.898]  Referring practitioner: HARPAL Darling  Date of Initial Visit: Type: THERAPY  Noted: 2022  Today's Date: 2022  Patient seen for 10 sessions           Patient reports: feeling anxiety about backward walking form last time and anticipating doing it again today.      Objective   See Exercise, Manual, and Modality Logs for complete treatment.       Assessment/Plan  Patient refused some balance/gait activities due to anxiety but was amenable to forward and lateral movement activities. Intermittent standing rest required due to fatigue.            Timed:  Manual Therapy:    0     mins  50874;  Therapeutic Exercise:    14     mins  98554;     Neuromuscular Abel:   31    mins  50142;    Therapeutic Activity:     0     mins  39889;     Gait Trainin     mins  54584;     Ultrasound:     0     mins  34009;    Electrical Stimulation:    0     mins  42014 ( );    Untimed:  Electrical Stimulation:    0     mins  56497 ( );  Mechanical Traction:    0     mins  07505;     Timed Treatment:   45   mins   Total Treatment:     45   mins    Tony Tolliver PT  Physical Therapist

## 2022-08-22 ENCOUNTER — TREATMENT (OUTPATIENT)
Dept: PHYSICAL THERAPY | Facility: CLINIC | Age: 72
End: 2022-08-22

## 2022-08-22 DIAGNOSIS — R29.898 LIMB WEAKNESS: Primary | ICD-10-CM

## 2022-08-22 PROCEDURE — 97110 THERAPEUTIC EXERCISES: CPT | Performed by: PHYSICAL THERAPIST

## 2022-08-22 PROCEDURE — 97112 NEUROMUSCULAR REEDUCATION: CPT | Performed by: PHYSICAL THERAPIST

## 2022-08-22 NOTE — PROGRESS NOTES
Physical Therapy Daily Progress Note        Patient: Momo Irvin   : 1950  Diagnosis/ICD-10 Code:  Limb weakness [R29.898]  Referring practitioner: HARPAL Darling  Date of Initial Visit: Type: THERAPY  Noted: 2022  Today's Date: 2022  Patient seen for 11 sessions           Patient reports: feeling about like normal.      Objective   See Exercise, Manual, and Modality Logs for complete treatment.       Assessment/Plan  Continued with focus on dynamic balance. He continues to decline backward walking and any step up activities outside of the //bars due to increased anxiety.             Timed:  Manual Therapy:    0     mins  61127;  Therapeutic Exercise:    10     mins  80896;     Neuromuscular Abel:   35    mins  61673;    Therapeutic Activity:     0     mins  99956;     Gait Trainin     mins  06689;     Ultrasound:     0     mins  99432;    Electrical Stimulation:    0     mins  43437 ( );    Untimed:  Electrical Stimulation:    0     mins  72417 ( );  Mechanical Traction:    0     mins  47141;     Timed Treatment:   45   mins   Total Treatment:     45   mins    Tony Tolliver PT  Physical Therapist

## 2022-08-24 ENCOUNTER — TREATMENT (OUTPATIENT)
Dept: PHYSICAL THERAPY | Facility: CLINIC | Age: 72
End: 2022-08-24

## 2022-08-24 DIAGNOSIS — R29.898 LIMB WEAKNESS: Primary | ICD-10-CM

## 2022-08-24 PROCEDURE — 97110 THERAPEUTIC EXERCISES: CPT | Performed by: PHYSICAL THERAPIST

## 2022-08-24 PROCEDURE — 97112 NEUROMUSCULAR REEDUCATION: CPT | Performed by: PHYSICAL THERAPIST

## 2022-08-24 NOTE — PROGRESS NOTES
Physical Therapy Daily Progress Note        Patient: Momo Irvin   : 1950  Diagnosis/ICD-10 Code:  Limb weakness [R29.898]  Referring practitioner: HARPAL Darling  Date of Initial Visit: Type: THERAPY  Noted: 2022  Today's Date: 2022  Patient seen for 12 sessions           Patient reports: feeling a little tired after last visit but not significant.      Objective   See Exercise, Manual, and Modality Logs for complete treatment.       Assessment/Plan  Advanced lateral hip strengthening and completed strength exercises at the beginning of treatment to increase the challenge of current dynamic balance/gait activities from fatigue. Patient fatigued after treatment and continues to limit treatment options when he is not given UE support due to anxiety.            Timed:  Manual Therapy:    0     mins  56790;  Therapeutic Exercise:    25     mins  35783;     Neuromuscular Abel:   20    mins  05715;    Therapeutic Activity:     0     mins  89634;     Gait Trainin     mins  15545;     Ultrasound:     0     mins  34846;    Electrical Stimulation:    0     mins  54576 ( );    Untimed:  Electrical Stimulation:    0     mins  18369 ( );  Mechanical Traction:    0     mins  43200;     Timed Treatment:   45   mins   Total Treatment:     45   mins    Tony Tolliver PT  Physical Therapist

## 2022-08-31 ENCOUNTER — TREATMENT (OUTPATIENT)
Dept: PHYSICAL THERAPY | Facility: CLINIC | Age: 72
End: 2022-08-31

## 2022-08-31 DIAGNOSIS — R29.898 LIMB WEAKNESS: Primary | ICD-10-CM

## 2022-08-31 PROCEDURE — 97530 THERAPEUTIC ACTIVITIES: CPT | Performed by: PHYSICAL THERAPIST

## 2022-08-31 PROCEDURE — 97110 THERAPEUTIC EXERCISES: CPT | Performed by: PHYSICAL THERAPIST

## 2022-08-31 NOTE — PROGRESS NOTES
Physical Therapy Daily Progress Note and Discharge Summary        Patient: Momo Irvin   : 1950  Diagnosis/ICD-10 Code:  Limb weakness [R29.898]  Referring practitioner: HARPAL Darling  Date of Initial Visit: Type: THERAPY  Noted: 2022  Today's Date: 2022  Patient seen for 13 sessions           Patient reports: feeling ready for discharge today.    Subjective Questionnaire: LEFS: 39/80    CLOF: wall walks in house, uses cart for walking inside stores unless with someone else, walking is unlimited but very slow, no falls in the last month, step to pattern and hand rail assist for descending stairs, B UE assist for sit to stand, UE assist to pull up from sit<>supine    Pain  Current pain ratin  At best pain ratin  At worst pain ratin  Location: L knee, low back    Objective          Ambulation     Ambulation: Stairs     Additional Stairs Ambulation Details  B UE assist using handrails to pull himself up stairs, step to pattern intermittently for ascending and every step descending.    Functional Assessment     Single Leg Stance   Left: 0 seconds  Right: 0 seconds    Comments  5x sit to standin sec using B UEs for armrest     6 min walk test: 1000 ft       See Exercise, Manual, and Modality Logs for complete treatment.     Goals  Plan Goals: Short Term Goals 4 weeks  1. Patient to be compliant with initial HEP. Not met  2. 5x sit to  < 60 sec. Met   3. B hip abd strength to > 4-/5. 25% met   4. Patient to improve LEFS score to at least 22/80. Met   5. 6 min walk test to >600 ft. Met     Long Term Goals 8 weeks  1. Patient to be independent with HEP. Not met   2. 5x sit to  < 45 sec. Met   3. 6 min walk test to >900 ft. Met   4. Single leg balance to >3 sec ea. Not met  5. Patient to improve LEFS score to at least 30/80. Met     Discharge Summary  Patient demonstrated improved functional strength and endurance since beginning PT, though anxiety/fear limited  his progress as he refused to attempt activities that minimized his use of UEs or reduced his stability. Patient unlikely to continue HEP provided by PT at home but verbalized willingness to use a stationary bike at home frequently. He is ready for discharge at this time due to low likelihood that he progresses beyond his current level of function with additional PT.             Timed:  Manual Therapy:    0     mins  49810;  Therapeutic Exercise:    23     mins  19676;     Neuromuscular Abel:   0    mins  15353;    Therapeutic Activity:     8     mins  63057;     Gait Trainin     mins  57947;     Ultrasound:     0     mins  85231;    Electrical Stimulation:    0     mins  64856 ( );    Untimed:  Electrical Stimulation:    0     mins  49028 ( );  Mechanical Traction:    0     mins  25146;     Timed Treatment:   31   mins   Total Treatment:     31   mins    Tony Tolliver PT  Physical Therapist

## 2022-09-15 ENCOUNTER — TELEPHONE (OUTPATIENT)
Dept: INTERNAL MEDICINE | Facility: CLINIC | Age: 72
End: 2022-09-15

## 2022-09-15 DIAGNOSIS — R79.89 ELEVATED SERUM CREATININE: Primary | ICD-10-CM

## 2022-09-15 RX ORDER — METAXALONE 800 MG/1
800 TABLET ORAL 3 TIMES DAILY PRN
Qty: 90 TABLET | Refills: 1 | Status: SHIPPED | OUTPATIENT
Start: 2022-09-15

## 2022-09-15 NOTE — TELEPHONE ENCOUNTER
Caller: Momo Irvin    Relationship: Self    Best call back number: [phone]    What is the best time to reach you:ANYTIME    Who are you requesting to speak with (clinical staff, provider,  specific staff member): PCP OR MA    Do you know the name of the person who called:     What was the call regarding:PATIENT WOULD LIKE A CALLBACK TO DISCUSS POSSIBLY GETTING A MEDICATION FOR STIFFNESS    Do you require a callback: YES

## 2022-09-16 ENCOUNTER — PRIOR AUTHORIZATION (OUTPATIENT)
Dept: INTERNAL MEDICINE | Facility: CLINIC | Age: 72
End: 2022-09-16

## 2022-10-03 ENCOUNTER — LAB (OUTPATIENT)
Dept: LAB | Facility: HOSPITAL | Age: 72
End: 2022-10-03

## 2022-10-03 ENCOUNTER — OFFICE VISIT (OUTPATIENT)
Dept: INTERNAL MEDICINE | Facility: CLINIC | Age: 72
End: 2022-10-03

## 2022-10-03 VITALS
TEMPERATURE: 97 F | HEIGHT: 72 IN | OXYGEN SATURATION: 99 % | BODY MASS INDEX: 34.21 KG/M2 | HEART RATE: 84 BPM | SYSTOLIC BLOOD PRESSURE: 138 MMHG | WEIGHT: 252.6 LBS | DIASTOLIC BLOOD PRESSURE: 82 MMHG

## 2022-10-03 DIAGNOSIS — Z00.00 MEDICARE ANNUAL WELLNESS VISIT, SUBSEQUENT: Primary | ICD-10-CM

## 2022-10-03 DIAGNOSIS — Z12.11 ENCOUNTER FOR SCREENING COLONOSCOPY: ICD-10-CM

## 2022-10-03 DIAGNOSIS — Z00.00 HEALTH CARE MAINTENANCE: Primary | ICD-10-CM

## 2022-10-03 DIAGNOSIS — Z00.00 HEALTH CARE MAINTENANCE: ICD-10-CM

## 2022-10-03 DIAGNOSIS — G47.00 INSOMNIA, UNSPECIFIED TYPE: ICD-10-CM

## 2022-10-03 LAB
ALBUMIN SERPL-MCNC: 3.9 G/DL (ref 3.5–5.2)
ALBUMIN/GLOB SERPL: 1.5 G/DL
ALP SERPL-CCNC: 50 U/L (ref 39–117)
ALT SERPL W P-5'-P-CCNC: 6 U/L (ref 1–41)
ANION GAP SERPL CALCULATED.3IONS-SCNC: 11.2 MMOL/L (ref 5–15)
AST SERPL-CCNC: 11 U/L (ref 1–40)
BASOPHILS # BLD AUTO: 0.05 10*3/MM3 (ref 0–0.2)
BASOPHILS NFR BLD AUTO: 1.2 % (ref 0–1.5)
BILIRUB SERPL-MCNC: 0.4 MG/DL (ref 0–1.2)
BUN SERPL-MCNC: 19 MG/DL (ref 8–23)
BUN/CREAT SERPL: 21.1 (ref 7–25)
CALCIUM SPEC-SCNC: 8.3 MG/DL (ref 8.6–10.5)
CHLORIDE SERPL-SCNC: 102 MMOL/L (ref 98–107)
CHOLEST SERPL-MCNC: 147 MG/DL (ref 0–200)
CO2 SERPL-SCNC: 26.8 MMOL/L (ref 22–29)
CREAT SERPL-MCNC: 0.9 MG/DL (ref 0.76–1.27)
DEPRECATED RDW RBC AUTO: 45.9 FL (ref 37–54)
EGFRCR SERPLBLD CKD-EPI 2021: 90.2 ML/MIN/1.73
EOSINOPHIL # BLD AUTO: 0.29 10*3/MM3 (ref 0–0.4)
EOSINOPHIL NFR BLD AUTO: 7.2 % (ref 0.3–6.2)
ERYTHROCYTE [DISTWIDTH] IN BLOOD BY AUTOMATED COUNT: 13.8 % (ref 12.3–15.4)
GLOBULIN UR ELPH-MCNC: 2.6 GM/DL
GLUCOSE SERPL-MCNC: 87 MG/DL (ref 65–99)
HBA1C MFR BLD: 5.4 % (ref 4.8–5.6)
HCT VFR BLD AUTO: 34.7 % (ref 37.5–51)
HDLC SERPL-MCNC: 56 MG/DL (ref 40–60)
HGB BLD-MCNC: 11 G/DL (ref 13–17.7)
IMM GRANULOCYTES # BLD AUTO: 0.01 10*3/MM3 (ref 0–0.05)
IMM GRANULOCYTES NFR BLD AUTO: 0.2 % (ref 0–0.5)
LDLC SERPL CALC-MCNC: 77 MG/DL (ref 0–100)
LDLC/HDLC SERPL: 1.38 {RATIO}
LYMPHOCYTES # BLD AUTO: 1.16 10*3/MM3 (ref 0.7–3.1)
LYMPHOCYTES NFR BLD AUTO: 28.7 % (ref 19.6–45.3)
MCH RBC QN AUTO: 28.9 PG (ref 26.6–33)
MCHC RBC AUTO-ENTMCNC: 31.7 G/DL (ref 31.5–35.7)
MCV RBC AUTO: 91.1 FL (ref 79–97)
MONOCYTES # BLD AUTO: 0.42 10*3/MM3 (ref 0.1–0.9)
MONOCYTES NFR BLD AUTO: 10.4 % (ref 5–12)
NEUTROPHILS NFR BLD AUTO: 2.11 10*3/MM3 (ref 1.7–7)
NEUTROPHILS NFR BLD AUTO: 52.3 % (ref 42.7–76)
NRBC BLD AUTO-RTO: 0 /100 WBC (ref 0–0.2)
PLATELET # BLD AUTO: 264 10*3/MM3 (ref 140–450)
PMV BLD AUTO: 9.7 FL (ref 6–12)
POTASSIUM SERPL-SCNC: 4.4 MMOL/L (ref 3.5–5.2)
PROT SERPL-MCNC: 6.5 G/DL (ref 6–8.5)
RBC # BLD AUTO: 3.81 10*6/MM3 (ref 4.14–5.8)
SODIUM SERPL-SCNC: 140 MMOL/L (ref 136–145)
TRIGL SERPL-MCNC: 70 MG/DL (ref 0–150)
TSH SERPL DL<=0.05 MIU/L-ACNC: 60.4 UIU/ML (ref 0.27–4.2)
VLDLC SERPL-MCNC: 14 MG/DL (ref 5–40)
WBC NRBC COR # BLD: 4.04 10*3/MM3 (ref 3.4–10.8)

## 2022-10-03 PROCEDURE — 84443 ASSAY THYROID STIM HORMONE: CPT | Performed by: PHYSICIAN ASSISTANT

## 2022-10-03 PROCEDURE — 1126F AMNT PAIN NOTED NONE PRSNT: CPT | Performed by: PHYSICIAN ASSISTANT

## 2022-10-03 PROCEDURE — 83036 HEMOGLOBIN GLYCOSYLATED A1C: CPT | Performed by: PHYSICIAN ASSISTANT

## 2022-10-03 PROCEDURE — G0439 PPPS, SUBSEQ VISIT: HCPCS | Performed by: PHYSICIAN ASSISTANT

## 2022-10-03 PROCEDURE — 1170F FXNL STATUS ASSESSED: CPT | Performed by: PHYSICIAN ASSISTANT

## 2022-10-03 PROCEDURE — 80061 LIPID PANEL: CPT | Performed by: PHYSICIAN ASSISTANT

## 2022-10-03 PROCEDURE — 36415 COLL VENOUS BLD VENIPUNCTURE: CPT | Performed by: PHYSICIAN ASSISTANT

## 2022-10-03 PROCEDURE — 85025 COMPLETE CBC W/AUTO DIFF WBC: CPT | Performed by: PHYSICIAN ASSISTANT

## 2022-10-03 PROCEDURE — 96160 PT-FOCUSED HLTH RISK ASSMT: CPT | Performed by: PHYSICIAN ASSISTANT

## 2022-10-03 PROCEDURE — 80053 COMPREHEN METABOLIC PANEL: CPT | Performed by: PHYSICIAN ASSISTANT

## 2022-10-03 PROCEDURE — 1160F RVW MEDS BY RX/DR IN RCRD: CPT | Performed by: PHYSICIAN ASSISTANT

## 2022-10-03 RX ORDER — FLUOXETINE 10 MG/1
10 CAPSULE ORAL DAILY
COMMUNITY
Start: 2022-09-10 | End: 2022-12-14

## 2022-10-03 NOTE — PROGRESS NOTES
The ABCs of the Annual Wellness Visit  Subsequent Medicare Wellness Visit    Chief Complaint   Patient presents with   • Medicare Wellness-subsequent      Subjective    History of Present Illness:  Momo Irvin is a 72 y.o. male who presents for a Subsequent Medicare Wellness Visit. Pt overall doing well and feeling well. Would like something for sleep.    The following portions of the patient's history were reviewed and   updated as appropriate: allergies, current medications, past family history, past medical history, past social history, past surgical history and problem list.    Compared to one year ago, the patient feels his physical   health is the same.    Compared to one year ago, the patient feels his mental   health is the same.    Recent Hospitalizations:  He was not admitted to the hospital during the last year.       Current Medical Providers:  Patient Care Team:  David Cortés PA-C as PCP - General (Physician Assistant)  Nunu Cisneros DO as Consulting Physician (Endocrinology)    Outpatient Medications Prior to Visit   Medication Sig Dispense Refill   • atorvastatin (LIPITOR) 10 MG tablet Take 1 tablet by mouth Every Night. 30 tablet 5   • Diclofenac Sodium (VOLTAREN) 1 % gel gel Apply 1 gram topically to indicated area 4 times daily as needed for mild to moderate pain. 100 g 2   • FLUoxetine (PROzac) 10 MG capsule Take 10 mg by mouth Daily.     • levothyroxine (SYNTHROID, LEVOTHROID) 75 MCG tablet Take 1 tablet by mouth Daily. 30 tablet 5   • metaxalone (Skelaxin) 800 MG tablet Take 1 tablet by mouth 3 (Three) Times a Day As Needed for Muscle Spasms. 90 tablet 1   • ondansetron ODT (ZOFRAN-ODT) 8 MG disintegrating tablet Take 1/2 to 1 tablet by mouth (dissolve under tongue or swallow) every 8 hours as needed for nausea. (Patient taking differently: As Needed. Take 1/2 to 1 tablet by mouth (dissolve under tongue or swallow) every 8 hours as needed for nausea.) 20 tablet 1   •  "vilazodone (VIIBRYD) 10 MG tablet tablet Take 1 tablet by mouth Daily for 7 days. 7 tablet 0     No facility-administered medications prior to visit.       No opioid medication identified on active medication list. I have reviewed chart for other potential  high risk medication/s and harmful drug interactions in the elderly.          Aspirin is not on active medication list.  Aspirin use is not indicated based on review of current medical condition/s. Risk of harm outweighs potential benefits.  .    There is no problem list on file for this patient.    Advance Care Planning  Advance Directive is not on file.  ACP discussion was held with the patient during this visit. Patient has an advance directive (not in EMR), copy requested.          Objective    Vitals:    10/03/22 0700   BP: 138/82   BP Location: Left arm   Patient Position: Sitting   Pulse: 84   Temp: 97 °F (36.1 °C)   TempSrc: Temporal   SpO2: 99%   Weight: 115 kg (252 lb 9.6 oz)   Height: 182.9 cm (72.01\")   PainSc: 0-No pain     Estimated body mass index is 34.25 kg/m² as calculated from the following:    Height as of this encounter: 182.9 cm (72.01\").    Weight as of this encounter: 115 kg (252 lb 9.6 oz).    BMI is >= 30 and <35. (Class 1 Obesity). The following options were offered after discussion;: exercise counseling/recommendations      Does the patient have evidence of cognitive impairment? No    Physical Exam            HEALTH RISK ASSESSMENT    Smoking Status:  Social History     Tobacco Use   Smoking Status Never Smoker   Smokeless Tobacco Never Used     Alcohol Consumption:  Social History     Substance and Sexual Activity   Alcohol Use Never     Fall Risk Screen:    STEADI Fall Risk Assessment was completed, and patient is at LOW risk for falls.Assessment completed on:10/3/2022    Depression Screening:  PHQ-2/PHQ-9 Depression Screening 10/3/2022   Little Interest or Pleasure in Doing Things 0-->not at all   Feeling Down, Depressed or Hopeless " 0-->not at all   Trouble Falling or Staying Asleep, or Sleeping Too Much 0-->not at all   Feeling Tired or Having Little Energy 0-->not at all   Poor Appetite or Overeating 0-->not at all   Feeling Bad about Yourself - or that You are a Failure or Have Let Yourself or Your Family Down 0-->not at all   Trouble Concentrating on Things, Such as Reading the Newspaper or Watching Television 0-->not at all   Moving or Speaking So Slowly that Other People Could Have Noticed? Or the Opposite - Being So Fidgety 0-->not at all   Thoughts that You Would be Better Off Dead or of Hurting Yourself in Some Way 0-->not at all   PHQ-9: Brief Depression Severity Measure Score 0       Health Habits and Functional and Cognitive Screening:  Functional & Cognitive Status 10/3/2022   Do you have difficulty preparing food and eating? No   Do you have difficulty bathing yourself, getting dressed or grooming yourself? No   Do you have difficulty using the toilet? No   Do you have difficulty moving around from place to place? No   Do you have trouble with steps or getting out of a bed or a chair? Yes   Current Diet Well Balanced Diet   Exercise (times per week) 2 times per week   Current Exercises Include Walking   Do you need help using the phone?  No   Are you deaf or do you have serious difficulty hearing?  Yes   Do you need help with transportation? No   Do you need help shopping? No   Do you need help preparing meals?  No   Do you need help with housework?  No   Do you need help with laundry? No   Do you need help taking your medications? No   Do you need help managing money? No   Do you ever drive or ride in a car without wearing a seat belt? Yes   Have you felt unusual stress, anger or loneliness in the last month? No   Who do you live with? Spouse   If you need help, do you have trouble finding someone available to you? No   Have you been bothered in the last four weeks by sexual problems? No   Do you have difficulty concentrating,  remembering or making decisions? No       Age-appropriate Screening Schedule:  Refer to the list below for future screening recommendations based on patient's age, sex and/or medical conditions. Orders for these recommended tests are listed in the plan section. The patient has been provided with a written plan.    Health Maintenance   Topic Date Due   • INFLUENZA VACCINE  Never done   • TDAP/TD VACCINES (1 - Tdap) 10/03/2022 (Originally 7/20/1969)   • ZOSTER VACCINE (1 of 2) 06/27/2023 (Originally 7/20/1999)              Assessment & Plan   CMS Preventative Services Quick Reference  Risk Factors Identified During Encounter  None Identified  The above risks/problems have been discussed with the patient.  Follow up actions/plans if indicated are seen below in the Assessment/Plan Section.  Pertinent information has been shared with the patient in the After Visit Summary.  Diagnoses and all orders for this visit:    1. Medicare annual wellness visit, subsequent (Primary)- overall doing well.    2. Insomnia, unspecified type- melatonin 5 mg qhs prn.    3. Encounter for screening colonoscopy- pt declines at this point.      Follow Up:   Return in about 3 months (around 1/3/2023) for Recheck.     An After Visit Summary and PPPS were made available to the patient.          I spent 30 minutes caring for Momo on this date of service. This time includes time spent by me in the following activities:obtaining and/or reviewing a separately obtained history

## 2022-10-06 ENCOUNTER — TELEPHONE (OUTPATIENT)
Dept: INTERNAL MEDICINE | Facility: CLINIC | Age: 72
End: 2022-10-06

## 2022-10-06 NOTE — TELEPHONE ENCOUNTER
----- Message from David Cortés PA-C sent at 10/5/2022  2:16 PM EDT -----  Overall all labs doing much better, patient still hypothyroid, follow-up with endocrinologist regarding this next month.

## 2022-10-06 NOTE — TELEPHONE ENCOUNTER
HUB TO READ AND CAN RELAY MESSAGE       Attempted to reach patient no answer lvm to call the office about lab results. David wanted patient to know that his labs are much better. Labs did show that patient is still hypothyroid, he will need to follow up with endocrinologist regarding next month.

## 2022-12-14 RX ORDER — FLUOXETINE 10 MG/1
CAPSULE ORAL
Qty: 90 CAPSULE | Refills: 0 | Status: SHIPPED | OUTPATIENT
Start: 2022-12-14

## 2023-01-03 ENCOUNTER — LAB (OUTPATIENT)
Dept: LAB | Facility: HOSPITAL | Age: 73
End: 2023-01-03
Payer: MEDICARE

## 2023-01-03 ENCOUNTER — OFFICE VISIT (OUTPATIENT)
Dept: INTERNAL MEDICINE | Facility: CLINIC | Age: 73
End: 2023-01-03
Payer: MEDICARE

## 2023-01-03 VITALS
OXYGEN SATURATION: 96 % | HEIGHT: 72 IN | BODY MASS INDEX: 36.49 KG/M2 | TEMPERATURE: 96.4 F | WEIGHT: 269.4 LBS | RESPIRATION RATE: 18 BRPM | DIASTOLIC BLOOD PRESSURE: 62 MMHG | SYSTOLIC BLOOD PRESSURE: 106 MMHG | HEART RATE: 86 BPM

## 2023-01-03 DIAGNOSIS — G47.00 INSOMNIA, UNSPECIFIED TYPE: Primary | ICD-10-CM

## 2023-01-03 DIAGNOSIS — Z00.00 HEALTH CARE MAINTENANCE: ICD-10-CM

## 2023-01-03 DIAGNOSIS — E03.9 HYPOTHYROIDISM, UNSPECIFIED TYPE: ICD-10-CM

## 2023-01-03 DIAGNOSIS — E78.5 HYPERLIPIDEMIA, UNSPECIFIED HYPERLIPIDEMIA TYPE: ICD-10-CM

## 2023-01-03 DIAGNOSIS — G89.29 CHRONIC BILATERAL LOW BACK PAIN WITHOUT SCIATICA: ICD-10-CM

## 2023-01-03 DIAGNOSIS — E89.0 POSTOPERATIVE HYPOTHYROIDISM: ICD-10-CM

## 2023-01-03 DIAGNOSIS — M54.50 CHRONIC BILATERAL LOW BACK PAIN WITHOUT SCIATICA: ICD-10-CM

## 2023-01-03 PROCEDURE — 1160F RVW MEDS BY RX/DR IN RCRD: CPT | Performed by: PHYSICIAN ASSISTANT

## 2023-01-03 PROCEDURE — 1159F MED LIST DOCD IN RCRD: CPT | Performed by: PHYSICIAN ASSISTANT

## 2023-01-03 PROCEDURE — 99214 OFFICE O/P EST MOD 30 MIN: CPT | Performed by: PHYSICIAN ASSISTANT

## 2023-01-03 RX ORDER — TRAZODONE HYDROCHLORIDE 100 MG/1
100 TABLET ORAL NIGHTLY
Qty: 30 TABLET | Refills: 2 | Status: SHIPPED | OUTPATIENT
Start: 2023-01-03

## 2023-01-03 NOTE — PROGRESS NOTES
[facility]  [address]  [facility] [address]  Dept: [phone]  Dept Fax: [phone]  Loc: [phone]  Loc Fax: [phone]    Momo Irvin  1950    Follow Up Office Visit Note    History of Present Illness:  Patient is a 72-year-old male in today to follow-up for insomnia, hypothyroidism, hyperlipidemia, and for chronic low back pain.  Patient having some trouble sleeping.  Patient would like some medication for this.    Patient under the care of endocrinology for hypothyroidism.  Patient taking medication as directed without problems or side effects.  Due to follow-up again soon.    Patient continues to struggle with some chronic low back pain.  Worse when he stands up from a seated position or lying position.    Patient needing cholesterol rechecked.    Patient reports overall doing well and feeling well and denies any symptoms today or recently.          The following portions of the patient's history were reviewed and updated as appropriate: allergies, current medications, past family history, past medical history, past social history, past surgical history, and problem list.    Medications:    Current Outpatient Medications:   •  atorvastatin (LIPITOR) 10 MG tablet, Take 1 tablet by mouth Every Night., Disp: 30 tablet, Rfl: 5  •  Diclofenac Sodium (VOLTAREN) 1 % gel gel, Apply 1 gram topically to indicated area 4 times daily as needed for mild to moderate pain., Disp: 100 g, Rfl: 2  •  FLUoxetine (PROzac) 10 MG capsule, TAKE 1 CAPSULE BY MOUTH EVERY DAY, Disp: 90 capsule, Rfl: 0  •  levothyroxine (SYNTHROID, LEVOTHROID) 75 MCG tablet, Take 1 tablet by mouth Daily., Disp: 30 tablet, Rfl: 5  •  Melatonin 5 MG capsule, Take 5 mg by mouth At Night As Needed (for sleep)., Disp: 60 each, Rfl: 1  •  metaxalone (Skelaxin) 800 MG tablet, Take 1 tablet by mouth 3 (Three) Times a Day As Needed for Muscle Spasms., Disp: 90 tablet, Rfl: 1  •   ondansetron ODT (ZOFRAN-ODT) 8 MG disintegrating tablet, Take 1/2 to 1 tablet by mouth (dissolve under tongue or swallow) every 8 hours as needed for nausea. (Patient taking differently: As Needed. Take 1/2 to 1 tablet by mouth (dissolve under tongue or swallow) every 8 hours as needed for nausea.), Disp: 20 tablet, Rfl: 1  •  vilazodone (VIIBRYD) 10 MG tablet tablet, Take 1 tablet by mouth Daily for 7 days., Disp: 7 tablet, Rfl: 0    Subjective  No Known Allergies     Past Medical History:   Diagnosis Date   • Depression    • History of thyroid cancer    • HL (hearing loss)    • Hypothyroidism        Past Surgical History:   Procedure Laterality Date   • THYROID SURGERY         Family History   Problem Relation Age of Onset   • Hyperlipidemia Mother    • Hypertension Mother    • Cancer Mother         Social History     Socioeconomic History   • Marital status:    Tobacco Use   • Smoking status: Never   • Smokeless tobacco: Never   Vaping Use   • Vaping Use: Never used   Substance and Sexual Activity   • Alcohol use: Never   • Drug use: Never   • Sexual activity: Defer       Review of Systems   Constitutional: Negative for activity change, chills, fatigue, fever and unexpected weight change.   HENT: Negative for congestion, ear pain, postnasal drip, sinus pressure and sore throat.    Eyes: Negative for pain, discharge and redness.   Respiratory: Negative for cough, shortness of breath and wheezing.    Cardiovascular: Negative for chest pain, palpitations and leg swelling.   Gastrointestinal: Negative for diarrhea, nausea and vomiting.   Endocrine: Negative for cold intolerance and heat intolerance.   Genitourinary: Negative for decreased urine volume and dysuria.   Musculoskeletal: Negative for arthralgias and myalgias.   Skin: Negative for rash and wound.   Neurological: Negative for dizziness, light-headedness and headaches.   Hematological: Does not bruise/bleed easily.   Psychiatric/Behavioral: Negative  for confusion, dysphoric mood and sleep disturbance. The patient is not nervous/anxious.          Objective  Vitals:    01/03/23 0805   BP: 106/62   BP Location: Right arm   Patient Position: Sitting   Cuff Size: Large Adult   Pulse: 86   Resp: 18   Temp: 96.4 °F (35.8 °C)   TempSrc: Temporal   SpO2: 96%   Weight: 122 kg (269 lb 6.4 oz)   Height: 182.9 cm (72.01\")     Body mass index is 36.53 kg/m².     Physical Exam  Physical Exam  Vitals and nursing note reviewed.   Constitutional:       General: He is not in acute distress.     Appearance: He is not ill-appearing.   HENT:      Head: Normocephalic.      Right Ear: Tympanic membrane, ear canal and external ear normal. There is no impacted cerumen.      Left Ear: Tympanic membrane, ear canal and external ear normal. There is no impacted cerumen.      Nose: No congestion or rhinorrhea.      Mouth/Throat:      Mouth: Mucous membranes are moist.      Pharynx: Oropharynx is clear. No oropharyngeal exudate or posterior oropharyngeal erythema.   Eyes:      General:         Right eye: No discharge.         Left eye: No discharge.      Extraocular Movements: Extraocular movements intact.      Conjunctiva/sclera: Conjunctivae normal.      Pupils: Pupils are equal, round, and reactive to light.   Cardiovascular:      Rate and Rhythm: Normal rate and regular rhythm.      Heart sounds: Normal heart sounds. No murmur heard.    No friction rub. No gallop.   Pulmonary:      Effort: Pulmonary effort is normal. No respiratory distress.      Breath sounds: Normal breath sounds. No wheezing.   Abdominal:      General: Bowel sounds are normal. There is no distension.      Palpations: Abdomen is soft. There is no mass.      Tenderness: There is no abdominal tenderness.   Musculoskeletal:         General: No swelling. Normal range of motion.      Cervical back: Normal range of motion. No tenderness.      Right lower leg: No edema.      Left lower leg: No edema.   Lymphadenopathy:       Cervical: No cervical adenopathy.   Skin:     Findings: No bruising, erythema or rash.   Neurological:      Mental Status: He is oriented to person, place, and time.      Gait: Gait normal.   Psychiatric:         Mood and Affect: Mood normal.         Behavior: Behavior normal.         Thought Content: Thought content normal.         Judgment: Judgment normal.         Diagnostic Data  Procedures    Assessment  Diagnoses and all orders for this visit:    1. Insomnia, unspecified type (Primary)    2. Hypothyroidism, unspecified type    3. Hyperlipidemia, unspecified hyperlipidemia type    4. Chronic bilateral low back pain without sciatica    5. Health care maintenance  -     CBC w AUTO Differential; Future  -     Comprehensive Metabolic Panel  -     Lipid Panel  -     Hemoglobin A1c; Future    Other orders  -     Diclofenac Sodium (VOLTAREN) 1 % gel gel; Apply 1 gram topically to indicated area 4 times daily as needed for mild to moderate pain.  Dispense: 100 g; Refill: 2        Plan    1. Insomnia, unspecified type (Primary)- worse, start trazodone 100 mg nightly as needed.    2. Hypothyroidism, unspecified type- seems to be doing well.  Continue to follow-up with endocrinology.    3. Hyperlipidemia, unspecified hyperlipidemia type- repeat FLP.    4. Chronic bilateral low back pain without sciatica- worse, sent in Voltaren gel.    5. Health care maintenance- obtain fasting labs.      Return in about 3 months (around 4/3/2023) for Recheck.    David Cortés PA-C  01/03/2023

## 2023-01-04 RX ORDER — LEVOTHYROXINE SODIUM 0.07 MG/1
75 TABLET ORAL DAILY
Qty: 30 TABLET | Refills: 1 | Status: SHIPPED | OUTPATIENT
Start: 2023-01-04

## 2023-01-19 ENCOUNTER — TELEPHONE (OUTPATIENT)
Dept: INTERNAL MEDICINE | Facility: CLINIC | Age: 73
End: 2023-01-19
Payer: MEDICARE

## 2023-01-19 NOTE — TELEPHONE ENCOUNTER
Caller: Albaro Momo    Relationship: Self    Best call back number: [phone]    What medication are you requesting: IN SOLES    What are your current symptoms: PLANTAR FASCIITIS      Have you had these symptoms before:    [x] Yes  [] No    Have you been treated for these symptoms before:   [x] Yes  [] No    If a prescription is needed, what is your preferred pharmacy and phone number: [id number] - [place], [place] - [address] [address] - [phone]  - [phone]      Additional notes: PATIENT WOULD LIKE TO KNOW IF HE WOULD BE ABLE TO GET A PRESCRIPTION FOR IN SOLES TO PUT INSIDE OF HIS SHOES FOR WHEN HIS PLANTAR FASCIITIS ACTS UP.

## 2023-01-20 DIAGNOSIS — M72.2 PLANTAR FASCIITIS: Primary | ICD-10-CM

## 2023-03-29 DIAGNOSIS — Z74.09 DECLINING MOBILITY: Primary | ICD-10-CM

## 2023-03-31 ENCOUNTER — OFFICE VISIT (OUTPATIENT)
Dept: INTERNAL MEDICINE | Facility: CLINIC | Age: 73
End: 2023-03-31
Payer: MEDICARE

## 2023-03-31 VITALS
HEART RATE: 62 BPM | OXYGEN SATURATION: 98 % | DIASTOLIC BLOOD PRESSURE: 70 MMHG | WEIGHT: 291 LBS | TEMPERATURE: 98.2 F | SYSTOLIC BLOOD PRESSURE: 132 MMHG | HEIGHT: 72 IN | BODY MASS INDEX: 39.42 KG/M2

## 2023-03-31 DIAGNOSIS — G47.00 INSOMNIA, UNSPECIFIED TYPE: Primary | ICD-10-CM
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
## 2023-03-31 DIAGNOSIS — Z00.00 HEALTH CARE MAINTENANCE: ICD-10-CM

## 2023-03-31 DIAGNOSIS — E78.5 HYPERLIPIDEMIA, UNSPECIFIED HYPERLIPIDEMIA TYPE: ICD-10-CM

## 2023-03-31 DIAGNOSIS — E03.9 HYPOTHYROIDISM, UNSPECIFIED TYPE: ICD-10-CM

## 2023-03-31 NOTE — PROGRESS NOTES
[facility]  [address]  [facility] [address]  Dept: [phone]  Dept Fax: [phone]  Loc: [phone]  Loc Fax: [phone]    Momo Irvin  1950    Follow Up Office Visit Note    History of Present Illness:  Patient is a 72-year-old male in today to follow-up for insomnia, hyperlipidemia, hypothyroidism, and for falling.  Patient on trazodone 100 mg nightly.  Sleeping well with this.    On 10 mg atorvastatin nightly.  Needing FLP rechecked.    On levothyroxine 75 mcg daily.  Needing thyroid level rechecked.  Patient has had increased appetite and weight gain recently.  Would like further evaluation for this as well.    Patient has fallen a couple of times over the last few weeks.  Would like a walker to help with his stability and a cane when he goes out into public.        The following portions of the patient's history were reviewed and updated as appropriate: allergies, current medications, past family history, past medical history, past social history, past surgical history, and problem list.    Medications:    Current Outpatient Medications:   •  atorvastatin (LIPITOR) 10 MG tablet, Take 1 tablet by mouth Every Night., Disp: 30 tablet, Rfl: 5  •  Diclofenac Sodium (VOLTAREN) 1 % gel gel, Apply 1 gram topically to indicated area 4 times daily as needed for mild to moderate pain., Disp: 100 g, Rfl: 2  •  FLUoxetine (PROzac) 10 MG capsule, TAKE 1 CAPSULE BY MOUTH EVERY DAY, Disp: 90 capsule, Rfl: 0  •  levothyroxine (SYNTHROID, LEVOTHROID) 75 MCG tablet, Take 1 tablet by mouth Daily. APPT NEEDED, Disp: 30 tablet, Rfl: 1  •  Melatonin 5 MG capsule, Take 5 mg by mouth At Night As Needed (for sleep)., Disp: 60 each, Rfl: 1  •  metaxalone (Skelaxin) 800 MG tablet, Take 1 tablet by mouth 3 (Three) Times a Day As Needed for Muscle Spasms., Disp: 90 tablet, Rfl: 1  •  traZODone (DESYREL) 100 MG tablet, Take 1 tablet by mouth Every Night., Disp:  30 tablet, Rfl: 2  •  vilazodone (VIIBRYD) 10 MG tablet tablet, Take 1 tablet by mouth Daily for 7 days., Disp: 7 tablet, Rfl: 0    Subjective  No Known Allergies     Past Medical History:   Diagnosis Date   • ADHD (attention deficit hyperactivity disorder)     He is very forgetful and cabnot stay on task   • Anxiety     Getx anxious about any slight change in his routine   • Depression    • History of thyroid cancer    • HL (hearing loss)    • Hypothyroidism        Past Surgical History:   Procedure Laterality Date   • THYROID SURGERY         Family History   Problem Relation Age of Onset   • Hyperlipidemia Mother    • Hypertension Mother    • Cancer Mother         Social History     Socioeconomic History   • Marital status:    Tobacco Use   • Smoking status: Never   • Smokeless tobacco: Never   • Tobacco comments:     Chewing only   Vaping Use   • Vaping Use: Never used   Substance and Sexual Activity   • Alcohol use: Never   • Drug use: Never   • Sexual activity: Not Currently       Review of Systems   Constitutional: Positive for appetite change and unexpected weight change. Negative for activity change, chills, fatigue and fever.   HENT: Negative for congestion, ear pain, postnasal drip, sinus pressure and sore throat.    Eyes: Negative for pain, discharge and redness.   Respiratory: Negative for cough, shortness of breath and wheezing.    Cardiovascular: Negative for chest pain, palpitations and leg swelling.   Gastrointestinal: Negative for diarrhea, nausea and vomiting.   Endocrine: Negative for cold intolerance and heat intolerance.   Genitourinary: Negative for decreased urine volume and dysuria.   Musculoskeletal: Negative for arthralgias and myalgias.   Skin: Negative for rash and wound.   Neurological: Negative for dizziness, light-headedness and headaches.   Hematological: Does not bruise/bleed easily.   Psychiatric/Behavioral: Negative for confusion, dysphoric mood and sleep disturbance. The  "patient is not nervous/anxious.          Objective  Vitals:    03/31/23 1407   BP: 132/70   Pulse: 62   Temp: 98.2 °F (36.8 °C)   SpO2: 98%   Weight: 132 kg (291 lb)   Height: 182.9 cm (72.01\")   PainSc: 0-No pain     Body mass index is 39.46 kg/m².     Physical Exam  Physical Exam  Vitals and nursing note reviewed.   Constitutional:       General: He is not in acute distress.     Appearance: He is not ill-appearing.   HENT:      Head: Normocephalic.      Right Ear: Tympanic membrane, ear canal and external ear normal. There is no impacted cerumen.      Left Ear: Tympanic membrane, ear canal and external ear normal. There is no impacted cerumen.      Nose: No congestion or rhinorrhea.      Mouth/Throat:      Mouth: Mucous membranes are moist.      Pharynx: Oropharynx is clear. No oropharyngeal exudate or posterior oropharyngeal erythema.   Eyes:      General:         Right eye: No discharge.         Left eye: No discharge.      Extraocular Movements: Extraocular movements intact.      Conjunctiva/sclera: Conjunctivae normal.      Pupils: Pupils are equal, round, and reactive to light.   Cardiovascular:      Rate and Rhythm: Normal rate and regular rhythm.      Heart sounds: Normal heart sounds. No murmur heard.    No friction rub. No gallop.   Pulmonary:      Effort: Pulmonary effort is normal. No respiratory distress.      Breath sounds: Normal breath sounds. No wheezing.   Abdominal:      General: Bowel sounds are normal. There is no distension.      Palpations: Abdomen is soft. There is no mass.      Tenderness: There is no abdominal tenderness.   Musculoskeletal:         General: No swelling. Normal range of motion.      Cervical back: Normal range of motion. No tenderness.      Right lower leg: No edema.      Left lower leg: No edema.   Lymphadenopathy:      Cervical: No cervical adenopathy.   Skin:     Findings: No bruising, erythema or rash.   Neurological:      Mental Status: He is oriented to person, place, " and time.      Gait: Gait normal.   Psychiatric:         Mood and Affect: Mood normal.         Behavior: Behavior normal.         Thought Content: Thought content normal.         Judgment: Judgment normal.         Diagnostic Data  Procedures    Assessment  Diagnoses and all orders for this visit:    1. Insomnia, unspecified type (Primary)    2. Hyperlipidemia, unspecified hyperlipidemia type    3. Hypothyroidism, unspecified type    4. Health care maintenance  -     CBC w AUTO Differential; Future  -     Comprehensive Metabolic Panel  -     TSH; Future  -     Hemoglobin A1c; Future  -     Lipid Panel    5. Fall, initial encounter        Plan    1. Insomnia, unspecified type (Primary)- well-controlled on trazodone 100 mg nightly.  Keep same.  Continue to monitor.    2. Hyperlipidemia, unspecified hyperlipidemia type- on Lipitor 10 mg nightly.  Repeat FLP.    3. Hypothyroidism, unspecified type- on Synthroid 75 mcg daily.  Repeat TSH.    4. Health care maintenance- obtain fasting labs.    5. Fall, initial encounter- gave prescription for walker and cane in office today.  Advised to take to [employer] store.  If falls continue, may need to get MRI brain.        Return in about 3 months (around 6/30/2023) for Recheck.    David Cortés PA-C  03/31/2023  Answers for HPI/ROS submitted by the patient on 3/31/2023  What is the primary reason for your visit?: Other  Please describe your symptoms.: Please check A1C levels, thyroid level may need adjusted he gain weight back fast. He needs an antidepressant or thyroid meds changed, he has fell twice in a week, please encourage him to get hearing aides i this this has a lot to do with his balance too  Have you had these symptoms before?: Yes  How long have you been having these symptoms?: Greater than 2 weeks

## 2023-04-03 ENCOUNTER — TREATMENT (OUTPATIENT)
Dept: PHYSICAL THERAPY | Facility: CLINIC | Age: 73
End: 2023-04-03
Payer: MEDICARE

## 2023-04-03 DIAGNOSIS — R53.81 PHYSICAL DECONDITIONING: ICD-10-CM

## 2023-04-03 DIAGNOSIS — Z74.09 DECLINING MOBILITY: Primary | ICD-10-CM

## 2023-04-03 PROCEDURE — 97161 PT EVAL LOW COMPLEX 20 MIN: CPT | Performed by: PHYSICAL THERAPIST

## 2023-04-03 PROCEDURE — 97530 THERAPEUTIC ACTIVITIES: CPT | Performed by: PHYSICAL THERAPIST

## 2023-04-03 NOTE — PROGRESS NOTES
"Physical Therapy Initial Evaluation and Plan of Care     [facility], [address]  [place] [address]    Patient: Momo Irvin   : 1950  Diagnosis/ICD-10 Code:  Declining mobility [Z74.09]  Referring practitioner: No ref. provider found  Date of Initial Visit: 4/3/2023  Today's Date: 4/3/2023  Patient seen for 1 sessions           Subjective Questionnaire: LEFS:       Subjective Evaluation    History of Present Illness  Mechanism of injury: Multiple falls, fear of falling. He received PT for balance last year with fair results. Recently, he fell twice, once when leaning down to touch furniture. His doctor and he discussed using an assistive device and he has a SPC, which he occasionally uses. He continues to report significant fear of fall or walking without touching something like furniture or a shopping cart. Patient is unwilling to use a Rollator or walker bc \"he just doesn't want to\".    CLOF: wall and furniture walks in house and uses cart for walking inside store but is open to using SPC, walking is unlimited but very slow, multiple falls this year (at least 3), step to pattern and hand rail assist for stairs, B UE assist for sit to stand, UE assist to pull up from sit<>supine, requires Ax1 for floor transfers    Medical history: history of thyroid cancer with thyroid removed years ago, depression      Patient Occupation: retired  Pain  No pain reported    Social Support  Lives in: one-story house  Lives with: spouse    Patient Goals  Patient goals for therapy: improved balance             Objective          Strength/Myotome Testing     Left Hip   Planes of Motion   Flexion: 4-  Abduction: 3  External rotation: 4-    Right Hip   Planes of Motion   Flexion: 4  Abduction: 3  External rotation: 3+    Left Knee   Flexion: 4+  Extension: 4    Right Knee   Flexion: 4  Extension: 4+    Left Ankle/Foot   Dorsiflexion: 5  Inversion: 4+  Eversion: 4+    Right Ankle/Foot   Dorsiflexion: 4+  Inversion: " 4  Eversion: 4+    Additional Strength Details  Barely clears gluteals from the table during supine bridge test    Ambulation     Ambulation: Stairs     Additional Stairs Ambulation Details  Ascends using B handrails and reciprocal pattern, descend using B handrails and step to pattern leading with L LE    Comments   SPC in R UE with inconsistent patterning, short B step length, forward trunk flexion    Functional Assessment     Single Leg Stance   Left: 0 seconds  Right: 0 seconds    Comments  5x sit to stand: 28 sec using significant UE support and unwillingness to let go of arm rests to stand completely upright and unsupported    Static balance: patient seeks external support at all times, even with wide base of support          Assessment & Plan     Assessment  Impairments: abnormal coordination, abnormal gait, abnormal muscle firing, activity intolerance, impaired balance, impaired physical strength, lacks appropriate home exercise program and safety issue  Functional Limitations: carrying objects, lifting, walking, moving in bed, standing and stooping  Assessment details: Patient presents with declining functional mobility and falls secondary to severe impairment with static balance, B LE weakness, high fear and anxiety related to falls, intermittent unwillingness to use an assistive device due to self consciousness. Patient extensively counseled regarding his need to use an assistive device due to balance deficits and previous unwillingness to practice balance exercises due to fear/anxiety. He requires PT care to gain skills managing a SPC and to gain LE strength.    Barriers to therapy: high fear-anxiety related to gait/balance combined with unwillingness/hesitation with using assistive device secondary to feeling self-conscious  Prognosis: good    Goals  Plan Goals: Short Term Goals 4 weeks  1. Patient to be compliant with initial HEP  2. 5x sit to  < 24 sec.  3. B hip strength to >3+/5  4. Patient  to improve LEFS score to at least  33/80    Long Term Goals 8 weeks  1. Patient to be independent with HEP.  2. 5x sit to  < 20 sec.  3. Patient to demonstrate independence with SPC management and report consistent use of SPC for all ambulation.  4. Patient to improve LEFS score to at least 38/80.    Plan  Therapy options: will be seen for skilled therapy services  Planned modality interventions: cryotherapy and thermotherapy (hydrocollator packs)  Planned therapy interventions: ADL retraining, balance/weight-bearing training, functional ROM exercises, home exercise program, transfer training, therapeutic activities, strengthening, neuromuscular re-education, abdominal trunk stabilization, motor coordination training and stretching  Frequency: 2x week  Duration in weeks: 8  Plan details: 2x/week for 8 weeks        Timed:  Manual Therapy:    0     mins  90791;  Therapeutic Exercise:    0     mins  10876;     Neuromuscular Abel:    0    mins  84984;    Therapeutic Activity:     23     mins  88027;     Gait Trainin     mins  48502;     Ultrasound:     0     mins  20531;    Electrical Stimulation:    0     mins  20885 ( );    Untimed:  Electrical Stimulation:    0     mins  80477 ( );  Mechanical Traction:    0     mins  54412;     Timed Treatment:   23   mins   Total Treatment:     55   mins    PT SIGNATURE: Tony Tolliver PT   License Number: [id number]  DATE TREATMENT INITIATED: 4/3/2023    Initial Certification  Certification Period: 2023  I certify that the therapy services are furnished while this patient is under my care.  The services outlined above are required by this patient, and will be reviewed every 90 days.     PHYSICIAN:       DATE:     Please sign and return via fax to [phone].. Thank you, [facility].

## 2023-04-12 ENCOUNTER — TELEPHONE (OUTPATIENT)
Dept: PHYSICAL THERAPY | Facility: CLINIC | Age: 73
End: 2023-04-12

## 2023-04-19 ENCOUNTER — TREATMENT (OUTPATIENT)
Dept: PHYSICAL THERAPY | Facility: CLINIC | Age: 73
End: 2023-04-19
Payer: MEDICARE

## 2023-04-19 DIAGNOSIS — R53.81 PHYSICAL DECONDITIONING: ICD-10-CM

## 2023-04-19 DIAGNOSIS — Z74.09 DECLINING MOBILITY: Primary | ICD-10-CM

## 2023-04-19 NOTE — PROGRESS NOTES
Physical Therapy Daily Progress Note     [facility], [address]  [place] [address]      Patient: Momo Irvin   : 1950  Diagnosis/ICD-10 Code:  Declining mobility [Z74.09]  Referring practitioner: HEMAL Darling*  Date of Initial Visit: Type: THERAPY  Noted: 4/3/2023  Today's Date: 2023  Patient seen for 2 sessions           Patient reports: missing his last few appointments due to his wife having health issues but they are improving and he will be able to make his next appointments. He reports using SPC more frequently when he cannot use a cart or furniture walk.       Objective   See Exercise, Manual, and Modality Logs for complete treatment.       Assessment/Plan  Today focused on discussing psychosocial issues that patient brought up early in the visit. These encompassed depression and coping with his physical decline, family communication, and loss of friends. He expressed openness to CBT to process these things and his PCP was informed of this discussion. Continued exercises from previous visit as he was inconsistent with completing them at home. Improved height noted during bridges today compared to eval.             Timed:  Manual Therapy:    0     mins  57594;  Therapeutic Exercise:    20     mins  87656;     Neuromuscular Abel:   0    mins  68615;    Therapeutic Activity:     30     mins  05126;     Gait Trainin     mins  79976;     Ultrasound:     0     mins  42535;    Electrical Stimulation:    0     mins  88875 ( );    Untimed:  Electrical Stimulation:    0     mins  12534 ( );  Mechanical Traction:    0     mins  41791;     Timed Treatment:   50   mins   Total Treatment:     50   mins    Tony Tolliver PT  Physical Therapist

## 2023-04-21 ENCOUNTER — TREATMENT (OUTPATIENT)
Dept: PHYSICAL THERAPY | Facility: CLINIC | Age: 73
End: 2023-04-21
Payer: MEDICARE

## 2023-04-21 DIAGNOSIS — Z74.09 DECLINING MOBILITY: Primary | ICD-10-CM

## 2023-04-21 DIAGNOSIS — R53.81 PHYSICAL DECONDITIONING: ICD-10-CM

## 2023-04-21 DIAGNOSIS — R29.898 LIMB WEAKNESS: ICD-10-CM

## 2023-04-21 NOTE — PROGRESS NOTES
Physical Therapy Daily Progress Note    [address] [facility], [address]  [place] [address]      Patient: Momo Irvin   : 1950  Diagnosis/ICD-10 Code:  Declining mobility [Z74.09]  Referring practitioner: HEMAL Darling*  Date of Initial Visit: Type: THERAPY  Noted: 4/3/2023  Today's Date: 2023  Patient seen for 3 sessions           Patient reports: feeling less stiff after last visit. He physically feels good today.       Objective   See Exercise, Manual, and Modality Logs for complete treatment.       Assessment/Plan  Introduced dynamic balance practice using SPC. He successfully navigated multiple dynamic gait scenarios with SPC assist and CGA without loss of balance. His potential to improve dynamic balance apart from his SPC is poor due to high anxiety, which has limited his willingness to complete activities where he feels unsafe in the past in PT.          Timed:  Manual Therapy:    0     mins  82593;  Therapeutic Exercise:    15     mins  75332;     Neuromuscular Abel:   25    mins  28294;    Therapeutic Activity:     0     mins  68923;     Gait Trainin     mins  87897;     Ultrasound:     0     mins  36515;    Electrical Stimulation:    0     mins  10843 ( );    Untimed:  Electrical Stimulation:    0     mins  73234 ( );  Mechanical Traction:    0     mins  26460;     Timed Treatment:   40   mins   Total Treatment:     40   mins    Tony Tolliver PT  Physical Therapist

## 2023-04-27 ENCOUNTER — TELEPHONE (OUTPATIENT)
Dept: INTERNAL MEDICINE | Facility: CLINIC | Age: 73
End: 2023-04-27
Payer: MEDICARE

## 2023-04-27 NOTE — TELEPHONE ENCOUNTER
NANCIE WITH PATIENT AIDS CALLED AND STATED THAT SHE RECEIVED AN ORDER FOR A CANE AND A WALKER FOR PT. NANCIE STATED THAT SHE NEEDS THE OFFICE NOTE THAT GOES WITH THIS ORDER. IF YOU HAVE ANY QUESTIONS PLEASE CALL NANCIE [phone]. PLEASE FAX THE OFFICE NOTE [phone].

## 2023-05-03 ENCOUNTER — TREATMENT (OUTPATIENT)
Dept: PHYSICAL THERAPY | Facility: CLINIC | Age: 73
End: 2023-05-03
Payer: MEDICARE

## 2023-05-03 DIAGNOSIS — Z74.09 DECLINING MOBILITY: Primary | ICD-10-CM

## 2023-05-03 DIAGNOSIS — R53.81 PHYSICAL DECONDITIONING: ICD-10-CM

## 2023-05-03 DIAGNOSIS — R29.898 LIMB WEAKNESS: ICD-10-CM

## 2023-05-03 NOTE — PROGRESS NOTES
Physical Therapy Daily Progress Note    [address] [facility], [address]  [place] [address]      Patient: Momo Irvin   : 1950  Diagnosis/ICD-10 Code:  Declining mobility [Z74.09]  Referring practitioner: HEMAL Darling*  Date of Initial Visit: Type: THERAPY  Noted: 4/3/2023  Today's Date: 5/3/2023  Patient seen for 4 sessions           Patient reports: no recent falls and he continues to use his SPC at all times.      Objective   See Exercise, Manual, and Modality Logs for complete treatment.       Assessment/Plan  Continued dynamic gait activities to build skill with SPC. He correctly patterns 2 point gait with SPC in R UE with normal gait speed, but pattern breaks down when asked to increase gait speed. He still required intermittent sitting rest throughout treatment. End of treatment was again spent psychosocial issues that influence his mood/depression and how he may benefit from CBT.            Timed:  Manual Therapy:    0     mins  39233;  Therapeutic Exercise:    15     mins  70248;     Neuromuscular Abel:   10    mins  01440;    Therapeutic Activity:     19     mins  05421;     Gait Trainin     mins  71617;     Ultrasound:     0     mins  02828;    Electrical Stimulation:    0     mins  11455 ( );    Untimed:  Electrical Stimulation:    0     mins  47675 ( );  Mechanical Traction:    0     mins  69639;     Timed Treatment:   44   mins   Total Treatment:     44   mins    Tony Tolliver PT  Physical Therapist

## 2023-05-30 DIAGNOSIS — E89.0 POSTOPERATIVE HYPOTHYROIDISM: ICD-10-CM

## 2023-05-31 DIAGNOSIS — E89.0 POSTOPERATIVE HYPOTHYROIDISM: ICD-10-CM

## 2023-05-31 RX ORDER — LEVOTHYROXINE SODIUM 0.07 MG/1
TABLET ORAL
Qty: 30 TABLET | Refills: 0 | OUTPATIENT
Start: 2023-05-31

## 2023-06-01 RX ORDER — LEVOTHYROXINE SODIUM 0.07 MG/1
TABLET ORAL
Qty: 30 TABLET | Refills: 0 | OUTPATIENT
Start: 2023-06-01

## 2023-06-02 DIAGNOSIS — E89.0 POSTOPERATIVE HYPOTHYROIDISM: ICD-10-CM

## 2023-06-02 RX ORDER — LEVOTHYROXINE SODIUM 0.07 MG/1
TABLET ORAL
Qty: 30 TABLET | Refills: 0 | Status: SHIPPED | OUTPATIENT
Start: 2023-06-02

## 2023-07-22 DIAGNOSIS — E89.0 POSTOPERATIVE HYPOTHYROIDISM: ICD-10-CM

## 2023-07-24 RX ORDER — LEVOTHYROXINE SODIUM 0.07 MG/1
TABLET ORAL
Qty: 30 TABLET | Refills: 0 | OUTPATIENT
Start: 2023-07-24

## 2023-07-26 ENCOUNTER — LAB (OUTPATIENT)
Dept: INTERNAL MEDICINE | Facility: CLINIC | Age: 73
End: 2023-07-26
Payer: MEDICARE

## 2023-07-26 ENCOUNTER — OFFICE VISIT (OUTPATIENT)
Dept: INTERNAL MEDICINE | Facility: CLINIC | Age: 73
End: 2023-07-26
Payer: MEDICARE

## 2023-07-26 VITALS
OXYGEN SATURATION: 100 % | DIASTOLIC BLOOD PRESSURE: 80 MMHG | HEART RATE: 94 BPM | SYSTOLIC BLOOD PRESSURE: 106 MMHG | WEIGHT: 304.2 LBS | BODY MASS INDEX: 41.25 KG/M2 | TEMPERATURE: 96.9 F

## 2023-07-26 DIAGNOSIS — Z23 NEED FOR TDAP VACCINATION: ICD-10-CM

## 2023-07-26 DIAGNOSIS — G89.29 BILATERAL CHRONIC KNEE PAIN: ICD-10-CM

## 2023-07-26 DIAGNOSIS — E03.9 HYPOTHYROIDISM, UNSPECIFIED TYPE: ICD-10-CM

## 2023-07-26 DIAGNOSIS — Z12.5 SCREENING PSA (PROSTATE SPECIFIC ANTIGEN): ICD-10-CM

## 2023-07-26 DIAGNOSIS — E78.5 HYPERLIPIDEMIA, UNSPECIFIED HYPERLIPIDEMIA TYPE: Primary | ICD-10-CM

## 2023-07-26 DIAGNOSIS — M25.561 BILATERAL CHRONIC KNEE PAIN: ICD-10-CM

## 2023-07-26 DIAGNOSIS — G47.00 INSOMNIA, UNSPECIFIED TYPE: ICD-10-CM

## 2023-07-26 DIAGNOSIS — M25.562 BILATERAL CHRONIC KNEE PAIN: ICD-10-CM

## 2023-07-26 DIAGNOSIS — Z23 NEED FOR PNEUMOCOCCAL VACCINATION: ICD-10-CM

## 2023-07-26 LAB
ALBUMIN SERPL-MCNC: 4 G/DL (ref 3.5–5.2)
ALBUMIN/GLOB SERPL: 1.5 G/DL
ALP SERPL-CCNC: 59 U/L (ref 39–117)
ALT SERPL W P-5'-P-CCNC: 9 U/L (ref 1–41)
ANION GAP SERPL CALCULATED.3IONS-SCNC: 11 MMOL/L (ref 5–15)
AST SERPL-CCNC: 13 U/L (ref 1–40)
BILIRUB SERPL-MCNC: 0.6 MG/DL (ref 0–1.2)
BUN SERPL-MCNC: 23 MG/DL (ref 8–23)
BUN/CREAT SERPL: 22.5 (ref 7–25)
CALCIUM SPEC-SCNC: 8.4 MG/DL (ref 8.6–10.5)
CHLORIDE SERPL-SCNC: 106 MMOL/L (ref 98–107)
CHOLEST SERPL-MCNC: 207 MG/DL (ref 0–200)
CO2 SERPL-SCNC: 26 MMOL/L (ref 22–29)
CREAT SERPL-MCNC: 1.02 MG/DL (ref 0.76–1.27)
EGFRCR SERPLBLD CKD-EPI 2021: 77.1 ML/MIN/1.73
GLOBULIN UR ELPH-MCNC: 2.6 GM/DL
GLUCOSE SERPL-MCNC: 97 MG/DL (ref 65–99)
HDLC SERPL-MCNC: 51 MG/DL (ref 40–60)
LDLC SERPL CALC-MCNC: 138 MG/DL (ref 0–100)
LDLC/HDLC SERPL: 2.67 {RATIO}
POTASSIUM SERPL-SCNC: 4.3 MMOL/L (ref 3.5–5.2)
PROT SERPL-MCNC: 6.6 G/DL (ref 6–8.5)
PSA SERPL-MCNC: 0.51 NG/ML (ref 0–4)
SODIUM SERPL-SCNC: 143 MMOL/L (ref 136–145)
TRIGL SERPL-MCNC: 100 MG/DL (ref 0–150)
VLDLC SERPL-MCNC: 18 MG/DL (ref 5–40)

## 2023-07-26 PROCEDURE — 1160F RVW MEDS BY RX/DR IN RCRD: CPT | Performed by: PHYSICIAN ASSISTANT

## 2023-07-26 PROCEDURE — 1159F MED LIST DOCD IN RCRD: CPT | Performed by: PHYSICIAN ASSISTANT

## 2023-07-26 PROCEDURE — 85025 COMPLETE CBC W/AUTO DIFF WBC: CPT | Performed by: PHYSICIAN ASSISTANT

## 2023-07-26 PROCEDURE — 80061 LIPID PANEL: CPT | Performed by: PHYSICIAN ASSISTANT

## 2023-07-26 PROCEDURE — G0103 PSA SCREENING: HCPCS | Performed by: PHYSICIAN ASSISTANT

## 2023-07-26 PROCEDURE — 99214 OFFICE O/P EST MOD 30 MIN: CPT | Performed by: PHYSICIAN ASSISTANT

## 2023-07-26 PROCEDURE — 84443 ASSAY THYROID STIM HORMONE: CPT | Performed by: PHYSICIAN ASSISTANT

## 2023-07-26 PROCEDURE — 36415 COLL VENOUS BLD VENIPUNCTURE: CPT | Performed by: PHYSICIAN ASSISTANT

## 2023-07-26 PROCEDURE — 83036 HEMOGLOBIN GLYCOSYLATED A1C: CPT | Performed by: PHYSICIAN ASSISTANT

## 2023-07-26 PROCEDURE — 80053 COMPREHEN METABOLIC PANEL: CPT | Performed by: PHYSICIAN ASSISTANT

## 2023-07-26 NOTE — PROGRESS NOTES
[facility]  [address]  [facility] [address]  Dept: [phone]  Dept Fax: [phone]  Loc: [phone]  Loc Fax: [phone]    Mmoo Irvin  1950    Follow Up Office Visit Note    History of Present Illness:  Patient is a 72-year-old male in today to follow-up for insomnia, hyperlipidemia, and hypothyroidism.  Patient on trazodone 100 mg nightly.  Sleeping well with this.     On 10 mg atorvastatin nightly.  Needing FLP rechecked.     On levothyroxine 75 mcg daily.  Needing thyroid level rechecked.  Patient has had increased appetite and weight gain recently.  Would like further evaluation for this as well.    Patient overall doing well and feeling well.    Hyperlipidemia  Exacerbating diseases include hypothyroidism. Pertinent negatives include no chest pain, myalgias or shortness of breath.   Hypothyroidism  Associated symptoms include arthralgias. Pertinent negatives include no chest pain, chills, congestion, coughing, fatigue, fever, headaches, myalgias, nausea, rash, sore throat or vomiting.   Insomnia  Associated symptoms include arthralgias. Pertinent negatives include no chest pain, chills, congestion, coughing, fatigue, fever, headaches, myalgias, nausea, rash, sore throat or vomiting.     The following portions of the patient's history were reviewed and updated as appropriate: allergies, current medications, past family history, past medical history, past social history, past surgical history, and problem list.    Medications:    Current Outpatient Medications:     atorvastatin (LIPITOR) 10 MG tablet, Take 1 tablet by mouth Every Night., Disp: 30 tablet, Rfl: 5    FLUoxetine (PROzac) 10 MG capsule, TAKE 1 CAPSULE BY MOUTH EVERY DAY, Disp: 90 capsule, Rfl: 0    levothyroxine (SYNTHROID, LEVOTHROID) 75 MCG tablet, TAKE 1 TABLET BY MOUTH EVERY DAY, Disp: 30 tablet, Rfl: 0    Melatonin 5 MG capsule, Take 5 mg by mouth At Night As Needed  (for sleep)., Disp: 60 each, Rfl: 1    metaxalone (Skelaxin) 800 MG tablet, Take 1 tablet by mouth 3 (Three) Times a Day As Needed for Muscle Spasms., Disp: 90 tablet, Rfl: 1    traZODone (DESYREL) 100 MG tablet, Take 1 tablet by mouth Every Night., Disp: 30 tablet, Rfl: 2    Diclofenac Sodium (VOLTAREN) 1 % gel gel, Apply 1 gram topically to indicated area 4 times daily as needed for mild to moderate pain., Disp: 100 g, Rfl: 2    vilazodone (VIIBRYD) 10 MG tablet tablet, Take 1 tablet by mouth Daily for 7 days., Disp: 7 tablet, Rfl: 0    Subjective  No Known Allergies     Past Medical History:   Diagnosis Date    ADHD (attention deficit hyperactivity disorder)     He is very forgetful and cabnot stay on task    Anxiety     Getx anxious about any slight change in his routine    Depression     History of thyroid cancer     HL (hearing loss)     Hypothyroidism        Past Surgical History:   Procedure Laterality Date    THYROID SURGERY         Family History   Problem Relation Age of Onset    Hyperlipidemia Mother     Hypertension Mother     Cancer Mother         Social History     Socioeconomic History    Marital status:    Tobacco Use    Smoking status: Never    Smokeless tobacco: Never    Tobacco comments:     Chewing only   Vaping Use    Vaping Use: Never used   Substance and Sexual Activity    Alcohol use: Never    Drug use: Never    Sexual activity: Not Currently       Review of Systems   Constitutional:  Negative for activity change, chills, fatigue, fever and unexpected weight change.   HENT:  Negative for congestion, ear pain, postnasal drip, sinus pressure and sore throat.    Eyes:  Negative for pain, discharge and redness.   Respiratory:  Negative for cough, shortness of breath and wheezing.    Cardiovascular:  Negative for chest pain, palpitations and leg swelling.   Gastrointestinal:  Negative for diarrhea, nausea and vomiting.   Endocrine: Negative for cold intolerance and heat intolerance.    Genitourinary:  Negative for decreased urine volume and dysuria.   Musculoskeletal:  Positive for arthralgias. Negative for myalgias.   Skin:  Negative for rash and wound.   Neurological:  Negative for dizziness, light-headedness and headaches.   Hematological:  Does not bruise/bleed easily.   Psychiatric/Behavioral:  Negative for confusion, dysphoric mood and sleep disturbance. The patient has insomnia. The patient is not nervous/anxious.        Objective  Vitals:    07/26/23 0819   BP: 106/80   BP Location: Left arm   Patient Position: Sitting   Cuff Size: Large Adult   Pulse: 94   Temp: 96.9 °F (36.1 °C)   TempSrc: Temporal   SpO2: 100%   Weight: (!) 138 kg (304 lb 3.2 oz)     Body mass index is 41.25 kg/m².     Physical Exam  Physical Exam  Vitals and nursing note reviewed.   Constitutional:       General: He is not in acute distress.     Appearance: He is not ill-appearing.   HENT:      Head: Normocephalic.      Right Ear: Tympanic membrane, ear canal and external ear normal. There is no impacted cerumen.      Left Ear: Tympanic membrane, ear canal and external ear normal. There is no impacted cerumen.      Nose: No congestion or rhinorrhea.      Mouth/Throat:      Mouth: Mucous membranes are moist.      Pharynx: Oropharynx is clear. No oropharyngeal exudate or posterior oropharyngeal erythema.   Eyes:      General:         Right eye: No discharge.         Left eye: No discharge.      Extraocular Movements: Extraocular movements intact.      Conjunctiva/sclera: Conjunctivae normal.      Pupils: Pupils are equal, round, and reactive to light.   Cardiovascular:      Rate and Rhythm: Normal rate and regular rhythm.      Heart sounds: Normal heart sounds. No murmur heard.    No friction rub. No gallop.   Pulmonary:      Effort: Pulmonary effort is normal. No respiratory distress.      Breath sounds: Normal breath sounds. No wheezing.   Abdominal:      General: Bowel sounds are normal. There is no distension.       Palpations: Abdomen is soft. There is no mass.      Tenderness: There is no abdominal tenderness.   Musculoskeletal:         General: No swelling. Normal range of motion.      Cervical back: Normal range of motion. No tenderness.      Right lower leg: No edema.      Left lower leg: No edema.   Lymphadenopathy:      Cervical: No cervical adenopathy.   Skin:     Findings: No bruising, erythema or rash.   Neurological:      Mental Status: He is oriented to person, place, and time.      Gait: Gait normal.   Psychiatric:         Mood and Affect: Mood normal.         Behavior: Behavior normal.         Thought Content: Thought content normal.         Judgment: Judgment normal.       Diagnostic Data  Procedures    Assessment  Diagnoses and all orders for this visit:    1. Hyperlipidemia, unspecified hyperlipidemia type (Primary)    2. Hypothyroidism, unspecified type    3. Insomnia, unspecified type    4. Need for pneumococcal vaccination    5. Need for Tdap vaccination    6. Screening PSA (prostate specific antigen)  -     PSA SCREENING; Future    7. Bilateral chronic knee pain    Other orders  -     Diclofenac Sodium (VOLTAREN) 1 % gel gel; Apply 1 gram topically to indicated area 4 times daily as needed for mild to moderate pain.  Dispense: 100 g; Refill: 2        Plan    1. Hyperlipidemia, unspecified hyperlipidemia type (Primary)- on atorvastatin 10 mg nightly.  Repeat FLP.    2. Hypothyroidism, unspecified type- on Synthroid 75 mcg daily.  Repeat TSH.    3. Insomnia, unspecified type- on trazodone 100 mg nightly as needed.  Well-controlled.  Keep same.  Continue to monitor.    4. Need for pneumococcal vaccination- declined.    5. Need for Tdap vaccination- declined.    6. Screening PSA (prostate specific antigen)- ordered PSA.    7. Bilateral chronic knee pain- worse, Voltaren gel as directed as needed.      Return in about 3 months (around 10/26/2023) for Recheck.    David Cortés PA-C  07/26/2023

## 2023-07-27 ENCOUNTER — TELEPHONE (OUTPATIENT)
Dept: INTERNAL MEDICINE | Facility: CLINIC | Age: 73
End: 2023-07-27
Payer: MEDICARE

## 2023-07-27 DIAGNOSIS — E78.5 HYPERLIPIDEMIA, UNSPECIFIED HYPERLIPIDEMIA TYPE: ICD-10-CM

## 2023-07-27 DIAGNOSIS — E89.0 POSTOPERATIVE HYPOTHYROIDISM: ICD-10-CM

## 2023-07-27 RX ORDER — ATORVASTATIN CALCIUM 20 MG/1
20 TABLET, FILM COATED ORAL NIGHTLY
Qty: 90 TABLET | Refills: 1 | Status: SHIPPED | OUTPATIENT
Start: 2023-07-27

## 2023-07-27 RX ORDER — LEVOTHYROXINE SODIUM 0.1 MG/1
100 TABLET ORAL DAILY
Qty: 45 TABLET | Refills: 1 | Status: SHIPPED | OUTPATIENT
Start: 2023-07-27

## 2023-07-27 NOTE — TELEPHONE ENCOUNTER
Hub to read and can relay message    Attempted to reach patient no answer to let him know that David said raising his synthroid dosage will help with his appetite and fatigue. Patient is Hypothyroid that's why he's feeling the need to eat more and feels like he has no energy. Thank  you.

## 2023-07-27 NOTE — TELEPHONE ENCOUNTER
Result message sent to patients [facility]      ----- Message from David Cortés PA-C sent at 7/27/2023  8:59 AM EDT -----  Please advise patient thyroid function still low, increasing Synthroid to 100 mcg daily.  Please make sure that he is taking his thyroid medicine first thing in the morning at least an hour before eating or taking any other medicine.  Also, cholesterol higher, increase Lipitor to 20 mg nightly.  Thank you.

## 2023-08-30 DIAGNOSIS — E03.9 HYPOTHYROIDISM, UNSPECIFIED TYPE: Primary | ICD-10-CM

## 2023-10-05 ENCOUNTER — OFFICE VISIT (OUTPATIENT)
Dept: INTERNAL MEDICINE | Facility: CLINIC | Age: 73
End: 2023-10-05
Payer: MEDICARE

## 2023-10-05 ENCOUNTER — LAB (OUTPATIENT)
Dept: INTERNAL MEDICINE | Facility: CLINIC | Age: 73
End: 2023-10-05
Payer: MEDICARE

## 2023-10-05 VITALS
TEMPERATURE: 97.3 F | OXYGEN SATURATION: 100 % | DIASTOLIC BLOOD PRESSURE: 72 MMHG | BODY MASS INDEX: 41.45 KG/M2 | SYSTOLIC BLOOD PRESSURE: 110 MMHG | HEART RATE: 68 BPM | HEIGHT: 72 IN | WEIGHT: 306 LBS

## 2023-10-05 DIAGNOSIS — Z00.00 MEDICARE ANNUAL WELLNESS VISIT, SUBSEQUENT: Primary | ICD-10-CM

## 2023-10-05 DIAGNOSIS — Z00.00 MEDICARE ANNUAL WELLNESS VISIT, SUBSEQUENT: ICD-10-CM

## 2023-10-05 LAB
ALBUMIN SERPL-MCNC: 4.1 G/DL (ref 3.5–5.2)
ALBUMIN/GLOB SERPL: 1.4 G/DL
ALP SERPL-CCNC: 62 U/L (ref 39–117)
ALT SERPL W P-5'-P-CCNC: 6 U/L (ref 1–41)
ANION GAP SERPL CALCULATED.3IONS-SCNC: 9.4 MMOL/L (ref 5–15)
AST SERPL-CCNC: 14 U/L (ref 1–40)
BILIRUB SERPL-MCNC: 0.6 MG/DL (ref 0–1.2)
BUN SERPL-MCNC: 19 MG/DL (ref 8–23)
BUN/CREAT SERPL: 17.9 (ref 7–25)
CALCIUM SPEC-SCNC: 8.4 MG/DL (ref 8.6–10.5)
CHLORIDE SERPL-SCNC: 106 MMOL/L (ref 98–107)
CHOLEST SERPL-MCNC: 125 MG/DL (ref 0–200)
CO2 SERPL-SCNC: 26.6 MMOL/L (ref 22–29)
CREAT SERPL-MCNC: 1.06 MG/DL (ref 0.76–1.27)
DEPRECATED RDW RBC AUTO: 46.9 FL (ref 37–54)
EGFRCR SERPLBLD CKD-EPI 2021: 73.6 ML/MIN/1.73
ERYTHROCYTE [DISTWIDTH] IN BLOOD BY AUTOMATED COUNT: 13.8 % (ref 12.3–15.4)
GLOBULIN UR ELPH-MCNC: 2.9 GM/DL
GLUCOSE SERPL-MCNC: 100 MG/DL (ref 65–99)
HBA1C MFR BLD: 5.6 % (ref 4.8–5.6)
HCT VFR BLD AUTO: 38.1 % (ref 37.5–51)
HDLC SERPL-MCNC: 48 MG/DL (ref 40–60)
HGB BLD-MCNC: 12.7 G/DL (ref 13–17.7)
LDLC SERPL CALC-MCNC: 61 MG/DL (ref 0–100)
LDLC/HDLC SERPL: 1.25 {RATIO}
MCH RBC QN AUTO: 30.6 PG (ref 26.6–33)
MCHC RBC AUTO-ENTMCNC: 33.3 G/DL (ref 31.5–35.7)
MCV RBC AUTO: 91.8 FL (ref 79–97)
PLATELET # BLD AUTO: 261 10*3/MM3 (ref 140–450)
PMV BLD AUTO: 10.2 FL (ref 6–12)
POTASSIUM SERPL-SCNC: 4.3 MMOL/L (ref 3.5–5.2)
PROT SERPL-MCNC: 7 G/DL (ref 6–8.5)
RBC # BLD AUTO: 4.15 10*6/MM3 (ref 4.14–5.8)
SODIUM SERPL-SCNC: 142 MMOL/L (ref 136–145)
T4 FREE SERPL-MCNC: 0.76 NG/DL (ref 0.93–1.7)
TRIGL SERPL-MCNC: 84 MG/DL (ref 0–150)
TSH SERPL DL<=0.05 MIU/L-ACNC: 42.7 UIU/ML (ref 0.27–4.2)
VLDLC SERPL-MCNC: 16 MG/DL (ref 5–40)
WBC NRBC COR # BLD: 5.95 10*3/MM3 (ref 3.4–10.8)

## 2023-10-05 PROCEDURE — 84443 ASSAY THYROID STIM HORMONE: CPT | Performed by: PHYSICIAN ASSISTANT

## 2023-10-05 PROCEDURE — 84439 ASSAY OF FREE THYROXINE: CPT | Performed by: PHYSICIAN ASSISTANT

## 2023-10-05 PROCEDURE — 80053 COMPREHEN METABOLIC PANEL: CPT | Performed by: PHYSICIAN ASSISTANT

## 2023-10-05 PROCEDURE — 36415 COLL VENOUS BLD VENIPUNCTURE: CPT | Performed by: PHYSICIAN ASSISTANT

## 2023-10-05 PROCEDURE — 80061 LIPID PANEL: CPT | Performed by: PHYSICIAN ASSISTANT

## 2023-10-05 PROCEDURE — 83036 HEMOGLOBIN GLYCOSYLATED A1C: CPT | Performed by: PHYSICIAN ASSISTANT

## 2023-10-05 PROCEDURE — 85027 COMPLETE CBC AUTOMATED: CPT | Performed by: PHYSICIAN ASSISTANT

## 2023-10-05 RX ORDER — OMEPRAZOLE 40 MG/1
40 CAPSULE, DELAYED RELEASE ORAL DAILY
Qty: 90 CAPSULE | Refills: 1 | Status: SHIPPED | OUTPATIENT
Start: 2023-10-05

## 2023-10-05 NOTE — PROGRESS NOTES
The ABCs of the Annual Wellness Visit  Subsequent Medicare Wellness Visit    Kristina Irvin is a 73 y.o. male who presents for a Subsequent Medicare Wellness Visit. Pt overall doing well.    The following portions of the patient's history were reviewed and   updated as appropriate: allergies, current medications, past family history, past medical history, past social history, past surgical history, and problem list.    Compared to one year ago, the patient feels his physical   health is better.    Compared to one year ago, the patient feels his mental   health is better.    Recent Hospitalizations:  He was not admitted to the hospital during the last year.       Current Medical Providers:  Patient Care Team:  David Cortés PA-C as PCP - General (Physician Assistant)  Nunu Cisneros DO as Consulting Physician (Endocrinology)    Outpatient Medications Prior to Visit   Medication Sig Dispense Refill    atorvastatin (LIPITOR) 20 MG tablet Take 1 tablet by mouth Every Night. 90 tablet 1    Diclofenac Sodium (VOLTAREN) 1 % gel gel Apply 1 gram topically to indicated area 4 times daily as needed for mild to moderate pain. 100 g 2    FLUoxetine (PROzac) 10 MG capsule TAKE 1 CAPSULE BY MOUTH EVERY DAY 90 capsule 0    levothyroxine (SYNTHROID, LEVOTHROID) 100 MCG tablet Take 1 tablet by mouth Daily. 45 tablet 1    Melatonin 5 MG capsule Take 5 mg by mouth At Night As Needed (for sleep). 60 each 1    metaxalone (Skelaxin) 800 MG tablet Take 1 tablet by mouth 3 (Three) Times a Day As Needed for Muscle Spasms. 90 tablet 1    traZODone (DESYREL) 100 MG tablet Take 1 tablet by mouth Every Night. 30 tablet 2    vilazodone (VIIBRYD) 10 MG tablet tablet Take 1 tablet by mouth Daily for 7 days. 7 tablet 0     No facility-administered medications prior to visit.       No opioid medication identified on active medication list. I have reviewed chart for other potential  high risk medication/s and harmful  "drug interactions in the elderly.        Aspirin is not on active medication list.  Aspirin use is not indicated based on review of current medical condition/s. Risk of harm outweighs potential benefits.  .    There is no problem list on file for this patient.    Advance Care Planning   Advance Care Planning     Advance Directive is not on file.  ACP discussion was declined by the patient. Patient does not have an advance directive, declines further assistance.     Objective    Vitals:    10/05/23 0834   BP: 110/72   BP Location: Left arm   Patient Position: Sitting   Cuff Size: Large Adult   Pulse: 68   Temp: 97.3 °F (36.3 °C)   TempSrc: Temporal   SpO2: 100%   Weight: (!) 139 kg (306 lb)   Height: 182.9 cm (72.01\")   PainSc: 0-No pain     Estimated body mass index is 41.49 kg/m² as calculated from the following:    Height as of this encounter: 182.9 cm (72.01\").    Weight as of this encounter: 139 kg (306 lb).           Does the patient have evidence of cognitive impairment?   No    Lab Results   Component Value Date    TRIG 100 07/26/2023    HDL 51 07/26/2023     (H) 07/26/2023    VLDL 18 07/26/2023    HGBA1C 5.60 07/26/2023          HEALTH RISK ASSESSMENT    Smoking Status:  Social History     Tobacco Use   Smoking Status Never   Smokeless Tobacco Never   Tobacco Comments    Chewing only     Alcohol Consumption:  Social History     Substance and Sexual Activity   Alcohol Use Never     Fall Risk Screen:    MICHI Fall Risk Assessment was completed, and patient is at LOW risk for falls.Assessment completed on:10/5/2023    Depression Screening:      10/5/2023     8:00 AM   PHQ-2/PHQ-9 Depression Screening   Little Interest or Pleasure in Doing Things 0-->not at all   Feeling Down, Depressed or Hopeless 0-->not at all   Trouble Falling or Staying Asleep, or Sleeping Too Much 0-->not at all   Feeling Tired or Having Little Energy 0-->not at all   Poor Appetite or Overeating 0-->not at all   Feeling Bad about " Yourself - or that You are a Failure or Have Let Yourself or Your Family Down 0-->not at all   Trouble Concentrating on Things, Such as Reading the Newspaper or Watching Television 0-->not at all   Moving or Speaking So Slowly that Other People Could Have Noticed? Or the Opposite - Being So Fidgety 0-->not at all   Thoughts that You Would be Better Off Dead or of Hurting Yourself in Some Way 0-->not at all   PHQ-9: Brief Depression Severity Measure Score 0   If You Checked Off Any Problems, How Difficult Have These Problems Made It For You to Do Your Work, Take Care of Things at Home, or Get Along with Other People? not difficult at all       Health Habits and Functional and Cognitive Screening:      10/5/2023     8:45 AM   Functional & Cognitive Status   Eye Exam Up to date       Age-appropriate Screening Schedule:  Refer to the list below for future screening recommendations based on patient's age, sex and/or medical conditions. Orders for these recommended tests are listed in the plan section. The patient has been provided with a written plan.    Health Maintenance   Topic Date Due    COVID-19 Vaccine (3 - 2023-24 season) 12/25/2023 (Originally 9/1/2023)    INFLUENZA VACCINE  03/31/2024 (Originally 8/1/2023)    Pneumococcal Vaccine 65+ (1 - PCV) 07/26/2024 (Originally 7/20/2014)    TDAP/TD VACCINES (1 - Tdap) 07/26/2024 (Originally [birth year]/20/1968)    ZOSTER VACCINE (1 of 2) 07/26/2024 (Originally 7/20/1999)    BMI FOLLOWUP  03/29/2024    LIPID PANEL  07/26/2024    ANNUAL WELLNESS VISIT  10/05/2024    HEPATITIS C SCREENING  Completed    COLORECTAL CANCER SCREENING  Discontinued                  CMS Preventative Services Quick Reference  Risk Factors Identified During Encounter:    None Identified    The above risks/problems have been discussed with the patient.  Pertinent information has been shared with the patient in the After Visit Summary.    Diagnoses and all orders for this visit:    1. Medicare annual wellness  visit, subsequent (Primary)- overall doing well. Obtain fasting labs and follow up with these. Advised on nutrition and exercise.      Follow Up:   Next Medicare Wellness visit to be scheduled in 1 year.      An After Visit Summary and PPPS were made available to the patient.

## 2023-10-06 DIAGNOSIS — D64.9 ANEMIA, UNSPECIFIED TYPE: Primary | ICD-10-CM

## 2023-10-06 DIAGNOSIS — E89.0 POSTOPERATIVE HYPOTHYROIDISM: ICD-10-CM

## 2023-10-06 RX ORDER — LEVOTHYROXINE SODIUM 0.12 MG/1
125 TABLET ORAL DAILY
Qty: 90 TABLET | Refills: 1 | Status: SHIPPED | OUTPATIENT
Start: 2023-10-06

## 2023-10-09 ENCOUNTER — TELEPHONE (OUTPATIENT)
Dept: INTERNAL MEDICINE | Facility: CLINIC | Age: 73
End: 2023-10-09
Payer: MEDICARE

## 2023-10-09 NOTE — TELEPHONE ENCOUNTER
Name: CODEY SAHU  Relationship:   Best Callback Number: [phone]*  HUB PROVIDED THE RELAY MESSAGE FROM THE OFFICE

## 2023-12-11 ENCOUNTER — TELEPHONE (OUTPATIENT)
Dept: INTERNAL MEDICINE | Facility: CLINIC | Age: 73
End: 2023-12-11

## 2023-12-11 NOTE — TELEPHONE ENCOUNTER
"  Caller: Momo Irvin \"Mo\"    Relationship: Self    Best call back number: [phone]     Who are you requesting to speak with (clinical staff, provider,  specific staff member): UYEN PIZANO    What was the call regarding: PATIENT WOULD LIKE TO SPEAK WITH UYEN PIZANO ABOUT GETTING SOME MEDICATION FOR HIS NERVES.  PATIENT STATED THAT HE IS GOING THROUGH A ROUGH TIME RIGHT NOW.   "

## 2023-12-14 NOTE — TELEPHONE ENCOUNTER
"  Caller: Momo Irvin \"Mo\"    Relationship: Self    Best call back number: [phone]    What is the best time to reach you: ANY TIME    Who are you requesting to speak with (clinical staff, provider,  specific staff member): UYEN PIZANO    Do you know the name of the person who called: SELF    What was the call regarding: PATIENT CALLED AGAIN AND WOULD LIKE UYEN PIZANO TO CALL HIM WHEN HE GETS THE CHANCE      "

## 2024-02-11 DIAGNOSIS — E89.0 POSTOPERATIVE HYPOTHYROIDISM: ICD-10-CM

## 2024-02-12 RX ORDER — LEVOTHYROXINE SODIUM 0.12 MG/1
125 TABLET ORAL DAILY
Qty: 90 TABLET | Refills: 0 | Status: SHIPPED | OUTPATIENT
Start: 2024-02-12

## 2024-08-21 DIAGNOSIS — E89.0 POSTOPERATIVE HYPOTHYROIDISM: ICD-10-CM

## 2024-08-21 RX ORDER — OMEPRAZOLE 40 MG/1
40 CAPSULE, DELAYED RELEASE ORAL DAILY
Qty: 90 CAPSULE | Refills: 0 | Status: SHIPPED | OUTPATIENT
Start: 2024-08-21

## 2024-08-21 RX ORDER — LEVOTHYROXINE SODIUM 0.12 MG/1
125 TABLET ORAL DAILY
Qty: 90 TABLET | Refills: 0 | Status: SHIPPED | OUTPATIENT
Start: 2024-08-21

## 2024-09-11 NOTE — TELEPHONE ENCOUNTER
No answer no vm    OK FOR HUB TO RELAY MESSAGE  ----- Message from David Cortés PA-C sent at 10/6/2023  3:58 PM EDT -----  Thyroid fxn still low, increasing synthroid to 125 mcg qam.   Yes

## 2024-12-18 RX ORDER — OMEPRAZOLE 40 MG/1
40 CAPSULE, DELAYED RELEASE ORAL DAILY
Qty: 90 CAPSULE | Refills: 0 | OUTPATIENT
Start: 2024-12-18

## 2024-12-23 RX ORDER — OMEPRAZOLE 40 MG/1
40 CAPSULE, DELAYED RELEASE ORAL DAILY
Qty: 90 CAPSULE | Refills: 0 | OUTPATIENT
Start: 2024-12-23

## 2025-01-13 RX ORDER — OMEPRAZOLE 40 MG/1
40 CAPSULE, DELAYED RELEASE ORAL DAILY
Qty: 90 CAPSULE | Refills: 0 | OUTPATIENT
Start: 2025-01-13

## 2025-05-30 RX ORDER — OMEPRAZOLE 40 MG/1
40 CAPSULE, DELAYED RELEASE ORAL DAILY
Qty: 90 CAPSULE | Refills: 0 | OUTPATIENT
Start: 2025-05-30

## 2025-06-09 DIAGNOSIS — E89.0 POSTOPERATIVE HYPOTHYROIDISM: ICD-10-CM

## 2025-06-09 RX ORDER — LEVOTHYROXINE SODIUM 125 UG/1
125 TABLET ORAL DAILY
Qty: 90 TABLET | Refills: 0 | OUTPATIENT
Start: 2025-06-09

## 2025-06-12 ENCOUNTER — OFFICE VISIT (OUTPATIENT)
Dept: INTERNAL MEDICINE | Age: 75
End: 2025-06-12
Payer: MEDICARE

## 2025-06-12 VITALS
HEART RATE: 90 BPM | HEIGHT: 72 IN | BODY MASS INDEX: 40.04 KG/M2 | SYSTOLIC BLOOD PRESSURE: 118 MMHG | OXYGEN SATURATION: 98 % | WEIGHT: 295.6 LBS | TEMPERATURE: 98.2 F | DIASTOLIC BLOOD PRESSURE: 82 MMHG

## 2025-06-12 DIAGNOSIS — E89.0 POSTOPERATIVE HYPOTHYROIDISM: Primary | ICD-10-CM

## 2025-06-12 DIAGNOSIS — K21.00 GASTROESOPHAGEAL REFLUX DISEASE WITH ESOPHAGITIS, UNSPECIFIED WHETHER HEMORRHAGE: ICD-10-CM

## 2025-06-12 DIAGNOSIS — F32.A DEPRESSION, UNSPECIFIED DEPRESSION TYPE: ICD-10-CM

## 2025-06-12 RX ORDER — LEVOTHYROXINE SODIUM 125 UG/1
125 TABLET ORAL DAILY
Qty: 90 TABLET | Refills: 0 | Status: SHIPPED | OUTPATIENT
Start: 2025-06-12

## 2025-06-12 RX ORDER — FLUOXETINE 10 MG/1
10 CAPSULE ORAL DAILY
Qty: 90 CAPSULE | Refills: 0 | Status: SHIPPED | OUTPATIENT
Start: 2025-06-12

## 2025-06-12 NOTE — PROGRESS NOTES
[facility]  [address]  [facility] [address]  Dept: [phone]  Dept Fax: [phone]  Loc: [phone]  Loc Fax: [phone]    Momo Irvin  1950    Follow Up Office Visit Note    History of Present Illness:  Patient is a 74-year-old male in today to follow-up for hypothyroidism and depression.  Having depression symptoms again.  Denies any SI or HI.  Needing refill on omeprazole.    Hyperlipidemia  Exacerbating diseases include obesity. Pertinent negatives include no chest pain, myalgias or shortness of breath.   Obesity  Pertinent negative symptoms include no chest pain, no chills, no congestion, no cough, no fatigue, no fever, no headaches, no myalgias, no nausea, no rash, no sore throat, no dysuria and no vomiting.       The following portions of the patient's history were reviewed and updated as appropriate: allergies, current medications, past family history, past medical history, past social history, past surgical history, and problem list.    Medications:    Current Outpatient Medications:     FLUoxetine (PROzac) 10 MG capsule, Take 1 capsule by mouth Daily., Disp: 90 capsule, Rfl: 0    levothyroxine (SYNTHROID, LEVOTHROID) 125 MCG tablet, Take 1 tablet by mouth Daily. Appointment needed for further refills., Disp: 90 tablet, Rfl: 0    omeprazole (priLOSEC) 40 MG capsule, TAKE 1 CAPSULE BY MOUTH EVERY DAY, Disp: 90 capsule, Rfl: 0    vilazodone (VIIBRYD) 10 MG tablet tablet, Take 1 tablet by mouth Daily for 7 days., Disp: 7 tablet, Rfl: 0    Subjective  No Known Allergies     Past Medical History:   Diagnosis Date    ADHD (attention deficit hyperactivity disorder)     He is very forgetful and cabnot stay on task    Anxiety     Getx anxious about any slight change in his routine    Depression     History of thyroid cancer     HL (hearing loss)     Hypothyroidism        Past Surgical History:   Procedure Laterality Date    THYROID  "SURGERY         Family History   Problem Relation Age of Onset    Hyperlipidemia Mother     Hypertension Mother     Cancer Mother         Social History     Socioeconomic History    Marital status:    Tobacco Use    Smoking status: Never    Smokeless tobacco: Never    Tobacco comments:     Chewing only   Vaping Use    Vaping status: Never Used   Substance and Sexual Activity    Alcohol use: Never    Drug use: Never    Sexual activity: Not Currently       Review of Systems   Constitutional:  Negative for activity change, chills, fatigue, fever and unexpected weight change.   HENT:  Negative for congestion, ear pain, postnasal drip, sinus pressure and sore throat.    Eyes:  Negative for pain, discharge and redness.   Respiratory:  Negative for cough, shortness of breath and wheezing.    Cardiovascular:  Negative for chest pain, palpitations and leg swelling.   Gastrointestinal:  Negative for diarrhea, nausea and vomiting.   Endocrine: Negative for cold intolerance and heat intolerance.   Genitourinary:  Negative for decreased urine volume and dysuria.   Musculoskeletal:  Negative for arthralgias and myalgias.   Skin:  Negative for rash and wound.   Neurological:  Negative for dizziness, light-headedness and headaches.   Hematological:  Does not bruise/bleed easily.   Psychiatric/Behavioral:  Positive for dysphoric mood. Negative for confusion, self-injury, sleep disturbance and suicidal ideas. The patient is not nervous/anxious.          Objective  Vitals:    06/12/25 1439   BP: 118/82   BP Location: Right arm   Patient Position: Sitting   Cuff Size: Large Adult   Pulse: 90   Temp: 98.2 °F (36.8 °C)   TempSrc: Temporal   SpO2: 98%   Weight: 134 kg (295 lb 9.6 oz)   Height: 182.9 cm (72.01\")     Body mass index is 40.08 kg/m².     Physical Exam  Physical Exam  Vitals and nursing note reviewed.   Constitutional:       General: He is not in acute distress.     Appearance: He is not ill-appearing.   HENT:      " Head: Normocephalic.      Right Ear: Tympanic membrane, ear canal and external ear normal. There is no impacted cerumen.      Left Ear: Tympanic membrane, ear canal and external ear normal. There is no impacted cerumen.      Nose: No congestion or rhinorrhea.      Mouth/Throat:      Mouth: Mucous membranes are moist.      Pharynx: Oropharynx is clear. No oropharyngeal exudate or posterior oropharyngeal erythema.   Eyes:      General:         Right eye: No discharge.         Left eye: No discharge.      Extraocular Movements: Extraocular movements intact.      Conjunctiva/sclera: Conjunctivae normal.      Pupils: Pupils are equal, round, and reactive to light.   Cardiovascular:      Rate and Rhythm: Normal rate and regular rhythm.      Heart sounds: Normal heart sounds. No murmur heard.     No friction rub. No gallop.   Pulmonary:      Effort: Pulmonary effort is normal. No respiratory distress.      Breath sounds: Normal breath sounds. No wheezing.   Abdominal:      General: Bowel sounds are normal. There is no distension.      Palpations: Abdomen is soft. There is no mass.      Tenderness: There is no abdominal tenderness.   Musculoskeletal:         General: No swelling. Normal range of motion.      Cervical back: Normal range of motion. No tenderness.      Right lower leg: No edema.      Left lower leg: No edema.   Lymphadenopathy:      Cervical: No cervical adenopathy.   Skin:     Findings: No bruising, erythema or rash.   Neurological:      Mental Status: He is oriented to person, place, and time.      Gait: Gait normal.   Psychiatric:         Mood and Affect: Mood normal.         Behavior: Behavior normal.         Thought Content: Thought content normal.         Judgment: Judgment normal.         Diagnostic Data  Procedures    Assessment  Diagnoses and all orders for this visit:    1. Postoperative hypothyroidism (Primary)  -     levothyroxine (SYNTHROID, LEVOTHROID) 125 MCG tablet; Take 1 tablet by mouth  Daily. Appointment needed for further refills.  Dispense: 90 tablet; Refill: 0    2. Gastroesophageal reflux disease with esophagitis, unspecified whether hemorrhage    3. Depression, unspecified depression type    Other orders  -     FLUoxetine (PROzac) 10 MG capsule; Take 1 capsule by mouth Daily.  Dispense: 90 capsule; Refill: 0        Plan    1. Postoperative hypothyroidism (Primary) refilled Synthroid.    2. Gastroesophageal reflux disease with esophagitis, unspecified whether hemorrhage refilled omeprazole.    3. Depression, unspecified depression type worse, trial of Prozac.      Return in about 2 weeks (around 6/26/2025) for Medicare Wellness.    David Cortés PA-C  06/12/2025

## 2025-06-13 RX ORDER — OMEPRAZOLE 40 MG/1
40 CAPSULE, DELAYED RELEASE ORAL DAILY
Qty: 90 CAPSULE | Refills: 0 | Status: SHIPPED | OUTPATIENT
Start: 2025-06-13

## 2025-06-13 NOTE — TELEPHONE ENCOUNTER
"Caller: AlbaroMomo \"Mo\"    Relationship: Self    Best call back number: [phone]     Requested Prescriptions:   Requested Prescriptions     Pending Prescriptions Disp Refills    omeprazole (priLOSEC) 40 MG capsule 90 capsule 0     Sig: Take 1 capsule by mouth Daily.        Pharmacy where request should be sent: [id number] [address] [facility] [address] - [phone]  - [phone] FX     Last office visit with prescribing clinician: 6/12/2025   Last telemedicine visit with prescribing clinician: Visit date not found   Next office visit with prescribing clinician: 6/27/2025     Additional details provided by patient: PATIENT IS OUT OF MEDICATION     Does the patient have less than a 3 day supply:  [x] Yes  [] No    Would you like a call back once the refill request has been completed: [] Yes [x] No    If the office needs to give you a call back, can they leave a voicemail: [] Yes [x] No    Lilia Fortune MA   06/13/25 10:17 EDT       "